# Patient Record
Sex: MALE | Race: WHITE | NOT HISPANIC OR LATINO | Employment: UNEMPLOYED | URBAN - NONMETROPOLITAN AREA
[De-identification: names, ages, dates, MRNs, and addresses within clinical notes are randomized per-mention and may not be internally consistent; named-entity substitution may affect disease eponyms.]

---

## 2017-04-10 ENCOUNTER — HISTORY (OUTPATIENT)
Dept: FAMILY MEDICINE | Facility: OTHER | Age: 27
End: 2017-04-10

## 2017-04-10 ENCOUNTER — OFFICE VISIT - GICH (OUTPATIENT)
Dept: FAMILY MEDICINE | Facility: OTHER | Age: 27
End: 2017-04-10

## 2017-04-10 DIAGNOSIS — J00 ACUTE NASOPHARYNGITIS (COMMON COLD): ICD-10-CM

## 2017-06-09 ENCOUNTER — OFFICE VISIT - GICH (OUTPATIENT)
Dept: FAMILY MEDICINE | Facility: OTHER | Age: 27
End: 2017-06-09

## 2017-06-09 ENCOUNTER — HISTORY (OUTPATIENT)
Dept: FAMILY MEDICINE | Facility: OTHER | Age: 27
End: 2017-06-09

## 2017-06-09 ENCOUNTER — HOSPITAL ENCOUNTER (OUTPATIENT)
Dept: RADIOLOGY | Facility: OTHER | Age: 27
End: 2017-06-09
Attending: FAMILY MEDICINE

## 2017-06-09 DIAGNOSIS — L29.9 PRURITUS: ICD-10-CM

## 2017-06-09 DIAGNOSIS — R05.9 COUGH: ICD-10-CM

## 2017-06-10 ENCOUNTER — HISTORY (OUTPATIENT)
Dept: EMERGENCY MEDICINE | Facility: OTHER | Age: 27
End: 2017-06-10

## 2017-08-23 ENCOUNTER — HISTORY (OUTPATIENT)
Dept: FAMILY MEDICINE | Facility: OTHER | Age: 27
End: 2017-08-23

## 2017-08-23 ENCOUNTER — OFFICE VISIT - GICH (OUTPATIENT)
Dept: FAMILY MEDICINE | Facility: OTHER | Age: 27
End: 2017-08-23

## 2017-08-23 DIAGNOSIS — R21 RASH AND OTHER NONSPECIFIC SKIN ERUPTION: ICD-10-CM

## 2017-08-23 DIAGNOSIS — G47.00 INSOMNIA: ICD-10-CM

## 2017-08-23 DIAGNOSIS — Z72.0 TOBACCO USE: ICD-10-CM

## 2017-08-23 DIAGNOSIS — Z91.89 OTHER SPECIFIED PERSONAL RISK FACTORS, NOT ELSEWHERE CLASSIFIED: ICD-10-CM

## 2017-08-23 ASSESSMENT — ANXIETY QUESTIONNAIRES
3. WORRYING TOO MUCH ABOUT DIFFERENT THINGS: NOT AT ALL
GAD7 TOTAL SCORE: 4
1. FEELING NERVOUS, ANXIOUS, OR ON EDGE: SEVERAL DAYS
2. NOT BEING ABLE TO STOP OR CONTROL WORRYING: NOT AT ALL
7. FEELING AFRAID AS IF SOMETHING AWFUL MIGHT HAPPEN: NOT AT ALL
6. BECOMING EASILY ANNOYED OR IRRITABLE: SEVERAL DAYS
4. TROUBLE RELAXING: SEVERAL DAYS
5. BEING SO RESTLESS THAT IT IS HARD TO SIT STILL: SEVERAL DAYS

## 2017-08-23 ASSESSMENT — PATIENT HEALTH QUESTIONNAIRE - PHQ9: SUM OF ALL RESPONSES TO PHQ QUESTIONS 1-9: 7

## 2017-10-02 ENCOUNTER — COMMUNICATION - GICH (OUTPATIENT)
Dept: FAMILY MEDICINE | Facility: OTHER | Age: 27
End: 2017-10-02

## 2017-10-02 DIAGNOSIS — Z72.0 TOBACCO USE: ICD-10-CM

## 2017-10-02 ASSESSMENT — PATIENT HEALTH QUESTIONNAIRE - PHQ9: SUM OF ALL RESPONSES TO PHQ QUESTIONS 1-9: 14

## 2017-11-03 ENCOUNTER — COMMUNICATION - GICH (OUTPATIENT)
Dept: FAMILY MEDICINE | Facility: OTHER | Age: 27
End: 2017-11-03

## 2017-11-07 ENCOUNTER — OFFICE VISIT - GICH (OUTPATIENT)
Dept: FAMILY MEDICINE | Facility: OTHER | Age: 27
End: 2017-11-07

## 2017-11-07 DIAGNOSIS — Z72.0 TOBACCO USE: ICD-10-CM

## 2017-11-07 DIAGNOSIS — F41.8 OTHER SPECIFIED ANXIETY DISORDERS: ICD-10-CM

## 2017-11-07 DIAGNOSIS — H61.92: ICD-10-CM

## 2017-11-07 DIAGNOSIS — Z23 ENCOUNTER FOR IMMUNIZATION: ICD-10-CM

## 2017-11-07 DIAGNOSIS — J30.2 OTHER SEASONAL ALLERGIC RHINITIS: ICD-10-CM

## 2017-11-07 ASSESSMENT — PATIENT HEALTH QUESTIONNAIRE - PHQ9: SUM OF ALL RESPONSES TO PHQ QUESTIONS 1-9: 8

## 2017-11-29 ENCOUNTER — AMBULATORY - GICH (OUTPATIENT)
Dept: SCHEDULING | Facility: OTHER | Age: 27
End: 2017-11-29

## 2017-12-27 ENCOUNTER — HISTORY (OUTPATIENT)
Dept: EMERGENCY MEDICINE | Facility: OTHER | Age: 27
End: 2017-12-27

## 2017-12-28 NOTE — TELEPHONE ENCOUNTER
Patient Information     Patient Name MRN Sex Demarco Nascimento [id number] Male 1990      Telephone Encounter by James Rich at 10/2/2017  2:15 PM     Author:  James Rich Service:  (none) Author Type:  (none)     Filed:  10/2/2017  2:16 PM Encounter Date:  10/2/2017 Status:  Signed     :  James Rich            After birth date was verified, spoke with Demarco and let him know the below information from Rafa Chamberlain MD. Transferred patient to appointment line to schedule follow up depression appointment with Rafa Chamberlain MD. No further questions or concerns.    James Rich ....................  10/2/2017   2:16 PM

## 2017-12-28 NOTE — TELEPHONE ENCOUNTER
Patient Information     Patient Name MRN Sex Demarco Nascimento [id number] Male 1990      Telephone Encounter by James Rich at 10/2/2017 10:36 AM     Author:  James Rich Service:  (none) Author Type:  (none)     Filed:  10/2/2017 10:37 AM Encounter Date:  10/2/2017 Status:  Signed     :  James Rich            Left message to call back  ...................James Rich LPN....10/2/2017 10:37 AM

## 2017-12-28 NOTE — TELEPHONE ENCOUNTER
Patient Information     Patient Name MRN Sex Demarco Nascimento [id number] Male 1990      Telephone Encounter by Katie Echols at 11/3/2017  1:15 PM     Author:  Katie Echols Service:  (none) Author Type:  (none)     Filed:  11/3/2017  1:18 PM Encounter Date:  11/3/2017 Status:  Signed     :  Katie Echols            Patient states he had to cancel his appointment for today (last minute.) He is now scheduled for next Tuesday. He is wondering if Dr Chamberlain knows who could sew up the hole in his ear from piercing. States he had asked about this before but would like to get this set up so he can go back in the . He is OK with talking about it when he comes in next week.

## 2017-12-28 NOTE — TELEPHONE ENCOUNTER
Patient Information     Patient Name MRN Sex Demarco Nascimento [id number] Male 1990      Telephone Encounter by Rafa Chamberlain MD at 11/3/2017  2:57 PM     Author:  Rafa Chamberlain MD Service:  (none) Author Type:  Physician     Filed:  11/3/2017  3:00 PM Encounter Date:  11/3/2017 Status:  Signed     :  Rafa Chamberlain MD (Physician)            I left a message to see if our general surgeons do this. I think most ENT or dermatologists could do this also but it would require travel.  It would likely be cosmetic through and he would need to be able to pay out of pocket.

## 2017-12-28 NOTE — TELEPHONE ENCOUNTER
Patient Information     Patient Name MRN Sex Demarco Nascimento [id number] Male 1990      Telephone Encounter by Rafa Chamberlain MD at 10/2/2017  1:38 PM     Author:  Rafa Chamberlain MD Service:  (none) Author Type:  Physician     Filed:  10/2/2017  1:38 PM Encounter Date:  10/2/2017 Status:  Signed     :  Rafa Chamberlain MD (Physician)            Please have patient schedule a follow up with me in the next month.

## 2017-12-28 NOTE — TELEPHONE ENCOUNTER
Patient Information     Patient Name MRN Sex Demarco Nascimento [id number] Male 1990      Telephone Encounter by James Rich at 10/2/2017  1:56 PM     Author:  James Rich Service:  (none) Author Type:  (none)     Filed:  10/2/2017  1:57 PM Encounter Date:  10/2/2017 Status:  Signed     :  James Rich            Left message to call back  ...................James Rich LPN....10/2/2017 1:56 PM

## 2017-12-28 NOTE — TELEPHONE ENCOUNTER
Patient Information     Patient Name MRN Sex Demarco Nascimento [id number] Male 1990      Telephone Encounter by Katie Echols at 2017  1:40 PM     Author:  Katie Echols Service:  (none) Author Type:  (none)     Filed:  2017  1:41 PM Encounter Date:  11/3/2017 Status:  Signed     :  Katie Echols            Patient notified of Dr Chamberlain's response.

## 2017-12-28 NOTE — TELEPHONE ENCOUNTER
Patient Information     Patient Name MRN Sex Demarco Nascimento [id number] Male 1990      Telephone Encounter by Rafa Chamberlain MD at 2017  1:23 PM     Author:  Rafa Chamberlain MD Service:  (none) Author Type:  Physician     Filed:  2017  1:23 PM Encounter Date:  11/3/2017 Status:  Signed     :  Rafa Chamberlain MD (Physician)            Would need to be ENT or dermatology.

## 2017-12-28 NOTE — TELEPHONE ENCOUNTER
Patient Information     Patient Name MRN Sex Demarco Nascimento [id number] Male 1990      Telephone Encounter by Rox Kearns at 11/3/2017 11:40 AM     Author:  Rox Kearns Service:  (none) Author Type:  (none)     Filed:  11/3/2017 11:41 AM Encounter Date:  11/3/2017 Status:  Signed     :  Rox Kearns            JTC-pt has questions on getting pierced ears sewn up

## 2017-12-28 NOTE — PROGRESS NOTES
Patient Information     Patient Name MRN Sex Demarco Nascimento [id number] Male 1990      Progress Notes by Rafa Chamberlain MD at 2017  1:00 PM     Author:  Rafa Chamberlain MD Service:  (none) Author Type:  Physician     Filed:  2017  7:45 AM Encounter Date:  2017 Status:  Signed     :  Rafa Chamberlain MD (Physician)            SUBJECTIVE:  Demarco Epps is a 27 y.o. Male.  He comes in today to follow-up on several issues. He reports that the rash seemed to resolve with the prednisone burst. He has not had any recurrence in this issue.    He tells me that he has had some worsening issues with mood and motivation. This has been present for the past couple months. Reports lack of motivation. At times overeats. Sleep is better if he uses Unisom. He has variable sleep hygiene. Does not snore. Feels that sleep efficiency is normal.    He denies any ruminating thinking. He has not had any problems recently with panic attacks. He does not feel that he is having symptoms concerning for PTSD.    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 He does continue to smoke and would like to quit that for the overall health improvement but also notably for his cardiovascular endurance and health.    PHQ Depression Screening 2017   Date of PHQ exam (doc flow) 2017   1. Lack of interest/pleasure 0 - Not at all 1 - Several days   2. Feeling down/depressed 0 - Not at all 1 - Several days   PHQ-2 TOTAL SCORE 0 2   3. Trouble sleeping - 1 - Several days   4. Decreased energy - 1 - Several days   5. Appetite change - 0 - Not at all   6. Feelings of failure - 2 - More than half the days   7. Trouble concentrating - 0 - Not at all   8. Activity level - 1 - Several days   9. Hurting yourself - 0 - Not at all   PHQ-9 TOTAL SCORE - 7   PHQ-9 Severity Level - mild   Functional Impairment - somewhat difficult   Some recent data might be hidden        RICK-7 ANXIETY SCREENING 8/23/2017   RICK date (doc flow) 8/23/2017   Nervous, anxious 1   Cannot stop worrying 0   Worry about different things 0   Cannot relax 1   Feeling restless 1   Easily annoyed/irritated 1   Afraid of awful event 0   Score 4   Severity none   Some recent data might be hidden          Social History        Substance Use Topics          Smoking status:   Current Every Day Smoker      Years:  10.00      Types:  Cigars      Start date:  11/1/2015      Smokeless tobacco:   Current User      Types:  Chew       Comment: vape       Alcohol use   7.2 oz/week     12 Standard drinks or equivalent per week         I have personally reviewed and updated above noted social, family and/or past medical history.    A comprehensive review of systems was negative except for items noted in HPI/Subjective.      OBJECTIVE:  /80  Pulse 68  Wt 88 kg (194 lb)  BMI 29.4 kg/m2  EXAM:  General Appearance: Pleasant, alert, appropriate appearance for age. No acute distress  Chest/Respiratory Exam: Normal chest wall and respirations. Clear to auscultation.  Cardiovascular Exam: Regular rate and rhythm. S1, S2, no murmur, click, gallop, or rubs.    ASSESSMENT/Plan :          Demarco was seen today for depression.    Diagnoses and all orders for this visit:    Tobacco abuse  patient would like to start Wellbutrin to help him quit smoking. Discussed him tobacco cessation strategies. Quit plan was developed. She will follow-up with me in one month if he is not able to completely quit smoking.  -     buPROPion (WELLBUTRIN XL) 150 mg Extended-Release tablet; Take 1 tablet by mouth every morning.    Lack of motivation   patient reports that he is not depressed but does have symptoms concerning for dysthymia. I think the Wellbutrin that we are using for tobacco cessation may benefit his motivation but also stressed importance of her vascular health, releases neurotransmitters that can significantly improve mood and will  help increased testosterone which also may boost energy levels. Stressed importance of choosing healthy foods. Explained him which types of foods tend to provide higher nutritional value.    Rash   this has resolved    Insomnia, unspecified type   Discussed good sleep hygiene with patient, this includes using bed only for sleep or sex, no alcohol or caffiene for 5 hours prior to bedtime, no daytime naps and consistent sleep/wake times.  Also stressed importance of regular exercise.  Patient voiced good understanding.          There are no Patient Instructions on file for this visit.    Rafa Chamberlain MD    This document was created using computer generated templates and voice activated software.

## 2017-12-28 NOTE — PROGRESS NOTES
Patient Information     Patient Name MRN Sex     Demarco Epps [id number] Male 1990      Progress Notes by Rafa Chamberlain MD at 2017  1:30 PM     Author:  Rafa Chamberlain MD Service:  (none) Author Type:  Physician     Filed:  2017  8:41 AM Encounter Date:  2017 Status:  Signed     :  Rafa Chamberlain MD (Physician)            Nursing Notes:   Katie Echols  2017  1:52 PM  Signed  Patient comes in to the clinic today to follow up on his depression. He would also like a referral to have his ear piercing sewed up because of  guidelines.      SUBJECTIVE:  Demarco Epps is a 27 y.o. Male. He comes in today to follow-up on depression. He has been taking the Wellbutrin as prescribed. He did have a little bit of stomachache when he first started and then decided to stop the medication for 3 days. He reports when he stopped the medication he felt quite a bit worse so he did restart. He has noticed he was getting up better in the morning. Feels more energetic. His low mood is improved. He reports sleeping is still relatively poor but it is functional. He is utilizing Unisom which is helpful. He reports less ruminating thinking and anxiety. Overall just feels that he is doing better.      PHQ Depression Screening 10/2/2017 2017   Date of PHQ exam (doc flow) 10/2/2017 2017   1. Lack of interest/pleasure 2 - More than half the days 1 - Several days   2. Feeling down/depressed 2 - More than half the days 1 - Several days   PHQ-2 TOTAL SCORE 4 2   3. Trouble sleeping 2 - More than half the days 1 - Several days   4. Decreased energy 2 - More than half the days 1 - Several days   5. Appetite change 0 - Not at all 0 - Not at all   6. Feelings of failure 2 - More than half the days 2 - More than half the days   7. Trouble concentrating 2 - More than half the days 0 - Not at all   8. Activity level 2 - More than half the days 2 - More than half the days   9.  Hurting yourself 0 - Not at all 0 - Not at all   PHQ-9 TOTAL SCORE 14 8   PHQ-9 Severity Level moderate mild   Functional Impairment somewhat difficult -   Some recent data might be hidden       he continues to have concerns about his left earlobe and would like to have surgery to repair.    Social History        Substance Use Topics          Smoking status:   Current Every Day Smoker      Years:  10.00      Types:  Cigars      Start date:  11/1/2015      Smokeless tobacco:   Current User      Types:  Chew       Comment: vape       Alcohol use   7.2 oz/week     12 Standard drinks or equivalent per week         I have personally reviewed and updated above noted social, family and/or past medical history.    A comprehensive review of systems was negative except for items noted in HPI/Subjective.      OBJECTIVE:  /80  Pulse 64  Wt 88 kg (194 lb)  BMI 29.4 kg/m2  EXAM:  General Appearance: Pleasant, alert, appropriate appearance for age. No acute distress  Ear Exam: Normal TM's bilaterally. Normal auditory canals and external ears. Non-tender. Left earlobe with large healed hole from gaged earring  Nose Exam: Pale and boggy turbinates.  Clear drainage noted  OroPharynx Exam: Mild posterior inflammation  Neck Exam: Supple, no masses or nodes.  Thyroid Exam: No nodules or enlargement.  Chest/Respiratory Exam: Normal chest wall and respirations. Clear to auscultation.  Cardiovascular Exam: Regular rate and rhythm. S1, S2, no murmur, click, gallop, or rubs.    ASSESSMENT/Plan :        Results for orders placed or performed during the hospital encounter of 06/10/17      Comprehensive Metabolic Panel      Result  Value Ref Range    SODIUM 142 133 - 143 mmol/L    POTASSIUM 3.7 3.5 - 5.1 mmol/L    CHLORIDE 102 98 - 107 mmol/L    CO2,TOTAL 26 21 - 31 mmol/L    ANION GAP 14 5 - 18                    GLUCOSE 91 70 - 105 mg/dL    CALCIUM 8.9 8.6 - 10.3 mg/dL    BUN 18 7 - 25 mg/dL    CREATININE 1.11 0.70 - 1.30 mg/dL     BUN/CREAT RATIO           16                    GFR if African American >60 >60 ml/min/1.73m2    GFR if not African American >60 >60 ml/min/1.73m2    ALBUMIN 4.3 3.5 - 5.7 g/dL    PROTEIN,TOTAL 7.0 6.4 - 8.9 g/dL    GLOBULIN                  2.7 2.0 - 3.7 g/dL    A/G RATIO 1.6 1.0 - 2.0                    BILIRUBIN,TOTAL 0.3 0.3 - 1.0 mg/dL    ALK PHOSPHATASE 39 34 - 104 IU/L    ALT (SGPT) 28 7 - 52 IU/L    AST (SGOT) 28 13 - 39 IU/L   C-Reactive Protein      Result  Value Ref Range    C-REACTIVE PROTEIN <1.000 <1.000 mg/dL   CBC WITH AUTO DIFFERENTIAL      Result  Value Ref Range    WHITE BLOOD COUNT         7.9 4.5 - 11.0 thou/cu mm    RED BLOOD COUNT           4.40 4.30 - 5.90 mil/cu mm    HEMOGLOBIN                13.6 13.5 - 17.5 g/dL    HEMATOCRIT                40.4 37.0 - 53.0 %    MCV                       92 80 - 100 fL    MCH                       30.9 26.0 - 34.0 pg    MCHC                      33.7 32.0 - 36.0 g/dL    RDW                       13.2 11.5 - 15.5 %    PLATELET COUNT            196 140 - 440 thou/cu mm    MPV                       9.8 6.5 - 11.0 fL    NEUTROPHILS               75.3 (H) 42.0 - 72.0 %    LYMPHOCYTES               16.8 (L) 20.0 - 44.0 %    MONOCYTES                 6.1 <12.0 %    EOSINOPHILS               1.4 <8.0 %    BASOPHILS                 0.3 <3.0 %    ABSOLUTE NEUTROPHILS      6.0 1.7 - 7.0 thou/cu mm    ABSOLUTE LYMPHOCYTES      1.3 0.9 - 2.9 thou/cu mm    ABSOLUTE MONOCYTES        0.5 <0.9 thou/cu mm    ABSOLUTE EOSINOPHILS      0.1 <0.5 thou/cu mm    ABSOLUTE BASOPHILS        0.0 <0.3 thou/cu mm   TROPONIN I      Result  Value Ref Range    TROPONIN I <0.030 <0.034 ng/mL       Demarco was seen today for follow up and derm problem.    Diagnoses and all orders for this visit:    Disorder of ear lobe, left  I did discuss with our surgery team and they did not feel they would be able to do this repair. Will refer to dermatology. I explained to patient that this is a  cosmetic procedure and he may want to check with insurance coverage or [facility] prior to proceeding.  -     AMB CONSULT TO DERMATOLOGY; Future    Anxiety and depression   symptoms significantly improved. He does not feel however that symptoms have had complete resolution. Will increase Wellbutrin to 450 mg.  He does not report any past or current concerns with alcohol abuse but I would suggest that he discontinue altogether while we are treating his mood disorder. I explained that the Wellbutrin can cause increased effects of alcohol.    Tobacco abuse  patient has not yet formally quit smoking. Strongly suggest complete cessation.  -     buPROPion 450 mg Tb24; Take 450 mg by mouth every morning.    Acute seasonal allergic rhinitis, unspecified trigger  we will trial Flonase.  -     fluticasone (50 mcg per actuation) nasal solution (FLONASE); Inhale 1 Spray into both nostrils 2 times daily.    Need for prophylactic vaccination and inoculation against influenza  -     FLU VACCINE => 3 YRS PF QUADRIVALENT IIV4 IM  -     [place] ADMIN VACC INITIAL    Need for prophylactic vaccination with combined diphtheria-tetanus-pertussis (DTaP) vaccine  -     TDAP VACCINE IM  -     [place] ADMIN EA ADDL VACC          There are no Patient Instructions on file for this visit.    Rafa Chamberlain MD    This document was created using computer generated templates and voice activated software.

## 2017-12-28 NOTE — PROGRESS NOTES
"Patient Information     Patient Name MRN Sex Demarco Montez [id number] Male 1990      Progress Notes by Rafa Chamberlain MD at 2017 12:15 PM     Author:  Rafa Chamberlain MD Service:  (none) Author Type:  Physician     Filed:  6/15/2017  5:58 AM Encounter Date:  2017 Status:  Signed     :  Rafa Chamberlain MD (Physician)            SUBJECTIVE:  Demarco Epps is a 27 y.o. Male.  He comes in today for evaluation of 2 issues. He reports she has had migratory skin itching over the past month. He has not noticed any rash. Reports she will just have intense itching over his leg and then over several hours to a day will resolve and days later will appear to different area of his body. He feels that itching actually makes it better. He is not aware of any bug bites or exposure to plants or animals that would cause irritation. He has not changed his soap, laundry detergent, shampoo etc. No new clothes. Again has not noticed a rash. Currently not having symptoms.    Next issue is cough. Has been present for 2 months. The cough is typically nonproductive but occasionally will have some clear sputum. He reports occasional be associated with hoarse voice. Has not had any problems with swallowing. Denies any acid reflux. Denies any fevers or chills.  Patient does report that he \"vapors\" frequently. Has been doing this now for over 6 months. Not really sure if the cough seems to be associated with this use.      OBJECTIVE:  /82  Pulse 76  Temp 98.1  F (36.7  C)  Wt 93 kg (205 lb)  SpO2 98%  BMI 31.17 kg/m2  EXAM:  General Appearance: Pleasant, alert, appropriate appearance for age. No acute distress  Ear Exam: TMs normal.  No erythema  Nose Exam: Pale and boggy turbinates.  No drainage noted  OroPharynx Exam: Mild posterior inflammation  Neck Exam: Supple, no masses or nodes.  Chest/Respiratory Exam: Normal chest wall and respirations. Clear to auscultation.  Cardiovascular " Exam: Regular rate and rhythm. S1, S2, no murmur, click, gallop, or rubs.  Skin: Currently no rash identified      No results found for this or any previous visit.    ASSESSMENT/Plan :      Demarco was seen today for rash and cough.    Diagnoses and all orders for this visit:    Itching  -     predniSONE (DELTASONE) 20 mg tablet; Take 40mg with breakfast and 20mg with lunch for 5d.  -     triamcinolone 0.1% TOPICAL (KENALOG) 0.1 % lotion; Apply  topically to affected area(s) 2 times daily.    Subacute cough  -     XR CHEST 2 VIEWS PA AND LATERAL; Future    Patient was a difficult historian. I discussed with him that his symptoms seem to be migratory and mild in nature. His exam was reassuring. Patient had flat affect but really was looking for additional workup and trial of therapy. I discussed getting a chest x-ray for the cough. I don't think laboratory evaluation at this time would provide much value which he voiced good understanding about. I think ultimately he needs to quit date being as this is very likely to be causing at minimum the cough and could even be causing the itching that he is having some sort of a reaction to one of the several chemicals in the inhalants. I think a burst of prednisone would be reasonable as it may resolve subacute cough if this is due to postinfectious inflammation, mild underlying asthma or allergies. He may also use triamcinolone cream if he does have localized itching. Patient should follow up in clinic in a week or 2 if he is not having improvement of symptoms. He'll come in sooner with any worsening.    There are no Patient Instructions on file for this visit.    Rafa Chamberlain MD    This document was created using computer generated templates and voice activated software.

## 2017-12-28 NOTE — TELEPHONE ENCOUNTER
Patient Information     Patient Name MRN Sex Demarco Nascimento [id number] Male 1990      Telephone Encounter by James Rich at 10/2/2017 10:58 AM     Author:  James Rich Service:  (none) Author Type:  (none)     Filed:  10/2/2017 11:04 AM Encounter Date:  10/2/2017 Status:  Signed     :  James Rich            After birth date was verified, patient stated that he would like to increase his dose of Wellbutrin as he does not feel as though it is currently helping him. Please see new PHQ9 completed on 10/2.     Please advise.    James Rich ....................  10/2/2017   11:03 AM

## 2017-12-30 NOTE — NURSING NOTE
Patient Information     Patient Name MRN Sex Demarco Nascimento [id number] Male 1990      Nursing Note by Katie Echols at 2017  1:00 PM     Author:  Katie Echols Service:  (none) Author Type:  (none)     Filed:  2017  1:32 PM Encounter Date:  2017 Status:  Signed     :  Katie Echols            Patient presents to [facility] clinic. States he is here for a follow up to his previous visit then specifically asks if he could be started on a medication for depression.

## 2017-12-30 NOTE — NURSING NOTE
Patient Information     Patient Name MRN Sex Demarco Nascimento [id number] Male 1990      Nursing Note by Katie Echols at 2017  1:30 PM     Author:  Katie Echols Service:  (none) Author Type:  (none)     Filed:  2017  1:52 PM Encounter Date:  2017 Status:  Signed     :  Katie Echols            Patient comes in to the clinic today to follow up on his depression. He would also like a referral to have his ear piercing sewed up because of  guidelines.

## 2018-01-04 NOTE — PROGRESS NOTES
"Patient Information     Patient Name MRN Sex     Demarco Epps [id number] Male 1990      Progress Notes by Chuyita Jain NP at 4/10/2017  6:15 PM     Author:  Chuyita Jain NP Service:  (none) Author Type:  PHYS- Nurse Practitioner     Filed:  4/10/2017  7:04 PM Encounter Date:  4/10/2017 Status:  Signed     :  Chuyita Jain NP (PHYS- Nurse Practitioner)            Nursing Notes:   Roxane Leon  4/10/2017  6:52 PM  Signed  Patient presents to clinic with sinus pain, sinus pressure, and cough x3 day  Roxane Leon LPN....................  4/10/2017   6:29 PM    SUBJECTIVE:    Demarco Epps is a 26 y.o. male who presents for URI for 3 days    URI    This is a new problem. Episode onset: 3 days. There has been no fever. Associated symptoms include congestion, coughing, headaches, a plugged ear sensation, rhinorrhea, sinus pain, sneezing and a sore throat. Pertinent negatives include no ear pain, swollen glands, vomiting or wheezing. He has tried NSAIDs for the symptoms. The treatment provided mild relief.       No current outpatient prescriptions on file prior to visit.     No current facility-administered medications on file prior to visit.        REVIEW OF SYSTEMS:  Review of Systems   HENT: Positive for congestion, rhinorrhea, sneezing and sore throat. Negative for ear pain.    Respiratory: Positive for cough. Negative for wheezing.    Gastrointestinal: Negative for vomiting.   Neurological: Positive for headaches.       OBJECTIVE:  /80  Pulse 92  Temp 98.2  F (36.8  C) (Tympanic)  Resp 20  Ht 1.727 m (5' 8\")  Wt 90.9 kg (200 lb 6 oz)  BMI 30.47 kg/m2    EXAM:   Physical Exam   Constitutional: He is well-developed, well-nourished, and in no distress.   HENT:   Head: Normocephalic and atraumatic.   Right Ear: Tympanic membrane and ear canal normal.   Left Ear: Tympanic membrane and ear canal normal.   Nose: Rhinorrhea present.   Mouth/Throat: Uvula is midline and " mucous membranes are normal. Posterior oropharyngeal erythema present. No oropharyngeal exudate or posterior oropharyngeal edema.   Pressure in the maxillary sinus area   Eyes: Conjunctivae are normal.   Neck: Neck supple.   Cardiovascular: Normal rate, regular rhythm and normal heart sounds.    Pulmonary/Chest: Effort normal and breath sounds normal. No respiratory distress. He has no wheezes. He has no rales.   Lymphadenopathy:     He has no cervical adenopathy.   Skin: Skin is warm and dry. No rash noted.   Nursing note and vitals reviewed.      ASSESSMENT/PLAN:    ICD-10-CM    1. Acute nasopharyngitis J00         Plan:  Long discussion had about viral illness. He was not having it, stated it is a sinus infection. Explained it is viral first for the next 14 days. OTC discussed with home cares. Discussed that OTC does not take the cold away, only is symptom management. F/u if needed.   Symptoms likely due to virus. No antibiotic is needed at this time. Symptoms typically worse on days 2-5 and then stabilize and you are sick for days 5-12. Days 12-14 there is slow resolution and if there is a cough, studies show it can linger longer, however one is not as ill as in the beginning. If symptoms begin worsening or fail to improve after 14 days, return to clinic for reevaluation. All questions were answered and he is in agreement with plan.       IVONE ARREOLA NP ....................  4/10/2017   7:04 PM

## 2018-01-04 NOTE — NURSING NOTE
Patient Information     Patient Name MRN Sex Demarco Nascimento [id number] Male 1990      Nursing Note by Roxane Leon at 4/10/2017  6:15 PM     Author:  Roxane Leon Service:  (none) Author Type:  (none)     Filed:  4/10/2017  6:52 PM Encounter Date:  4/10/2017 Status:  Signed     :  Roxane Leon            Patient presents to clinic with sinus pain, sinus pressure, and cough x3 day  Roxane Leon LPN....................  4/10/2017   6:29 PM

## 2018-01-04 NOTE — PATIENT INSTRUCTIONS
Patient Information     Patient Name MRN Demarco Altamirano [id number] Male 1990      Patient Instructions by Chuyita Jain NP at 4/10/2017  6:15 PM     Author:  Chuyita Jain NP Service:  (none) Author Type:  PHYS- Nurse Practitioner     Filed:  4/10/2017  6:52 PM Encounter Date:  4/10/2017 Status:  Signed     :  Chuyita Jain NP (PHYS- Nurse Practitioner)            Cold Medicines   What are cold medicines?  Symptoms of the common cold start gradually over several days and usually last about two weeks. Symptoms may include sneezing, a stuffy or runny nose, sore throat, cough, watery eyes, mild headache, or body aches. A cold will go away on its own without treatment. However, there are many nonprescription products that may help relieve some of the symptoms of a cold. Cold medicines often contain more than one ingredient and are used to treat more than one symptom. Read the labels and buy products that have only the ingredients that you need. If you are not sure which medicine is best, ask your pharmacist.  How do they work?  Decongestants reduce swelling in your nose and sinuses. They may also lessen the amount of mucus made by your nose. If you use decongestants more often than directed, your stuffy nose may get worse.   Antihistamines block the effect of histamine. Histamine is a chemical your body makes when you have an allergic reaction. Antihistamines are most often used to treat itchy or watery eyes or a stuffy or runny nose caused by an allergy. Antihistamines may not help a stuffy or runny nose caused by a cold because they can make mucus thick and dry.  Mucolytics are medicines that make mucus thinner so that it is easier to cough up out of your throat and lungs.  Expectorants are cough medicines that may help to keep the mucus thin and bring up mucus from the lungs when you cough. This may relieve chest congestion and make it easier to breathe.   Cough suppressants  (antitussives) are medicines that lessen the urge to cough. They may give relief from a dry, hacking cough. If you have a cough that is wet sounding and produces mucus, it is important for you to cough the mucus up out of your lungs. For this reason, cough suppressants are not recommended for a wet sounding cough.  Fever and pain relievers, such as acetaminophen, aspirin, or other nonsteroidal anti-inflammatory drugs (NSAIDs), are often included in cold medicine. Read labels carefully to avoid taking more medicine than you need.  What else do I need to know about this medicine?    Talk to your healthcare provider if your symptoms start suddenly or you have severe symptoms. This may mean you have something more serious than a cold.    Follow the directions that come with your medicine, including information about food or alcohol. Make sure you know how and when to take your medicine. Do not take more or less than you are supposed to take.    Try to get all of your medicine at the same place. Your pharmacist can help make sure that all of your medicines are safe to take together.    Keep a list of your medicines with you. List all of the prescription medicines, nonprescription medicines, supplements, natural remedies, and vitamins that you take. Tell all healthcare providers who treat you about all of the products you are taking.    Many medicines have side effects. A side effect is a symptom or problem that is caused by the medicine. Ask your healthcare provider or pharmacist what side effects the medicine may cause and what you should do if you have side effects.    Nonsteroidal anti-inflammatory medicines (NSAIDs), such as ibuprofen, naproxen, and aspirin, may cause stomach bleeding and other problems. These risks increase with age. Read the label and take as directed. Unless recommended by your healthcare provider, do not take for more than 10 days for any reason.    Acetaminophen may cause liver damage or other  problems. Unless recommended by your provider, don't take more than 3000 milligrams (mg) in 24 hours. To make sure you don t take too much, check other medicines you take to see if they also contain acetaminophen. Ask your provider if you need to avoid drinking alcohol while taking this medicine.  If you have any questions, ask your healthcare provider or pharmacist for more information. Be sure to keep all appointments for provider visits or tests.      Symptoms likely due to virus. No antibiotic is needed at this time. Symptoms typically worse on days 2-5 and then stabilize and you are sick for days 5-12. Days 12-14 there is slow resolution and if there is a cough, studies show it can linger longer, however one is not as ill as in the beginning. If symptoms begin worsening or fail to improve after 14 days, return to clinic for reevaluation.

## 2018-01-23 ENCOUNTER — COMMUNICATION - GICH (OUTPATIENT)
Dept: FAMILY MEDICINE | Facility: OTHER | Age: 28
End: 2018-01-23

## 2018-01-23 DIAGNOSIS — Z72.0 TOBACCO USE: ICD-10-CM

## 2018-01-27 VITALS
OXYGEN SATURATION: 98 % | SYSTOLIC BLOOD PRESSURE: 124 MMHG | HEART RATE: 76 BPM | DIASTOLIC BLOOD PRESSURE: 82 MMHG | TEMPERATURE: 98.1 F | WEIGHT: 205 LBS

## 2018-01-27 VITALS
TEMPERATURE: 98.2 F | WEIGHT: 200.38 LBS | HEIGHT: 68 IN | RESPIRATION RATE: 20 BRPM | SYSTOLIC BLOOD PRESSURE: 120 MMHG | HEART RATE: 92 BPM | DIASTOLIC BLOOD PRESSURE: 80 MMHG | BODY MASS INDEX: 30.37 KG/M2

## 2018-01-27 VITALS — DIASTOLIC BLOOD PRESSURE: 80 MMHG | HEART RATE: 68 BPM | WEIGHT: 194 LBS | SYSTOLIC BLOOD PRESSURE: 122 MMHG

## 2018-01-27 VITALS
BODY MASS INDEX: 29.5 KG/M2 | WEIGHT: 194 LBS | DIASTOLIC BLOOD PRESSURE: 80 MMHG | HEART RATE: 64 BPM | SYSTOLIC BLOOD PRESSURE: 122 MMHG

## 2018-01-30 ASSESSMENT — ANXIETY QUESTIONNAIRES: GAD7 TOTAL SCORE: 4

## 2018-01-30 ASSESSMENT — PATIENT HEALTH QUESTIONNAIRE - PHQ9
SUM OF ALL RESPONSES TO PHQ QUESTIONS 1-9: 14
SUM OF ALL RESPONSES TO PHQ QUESTIONS 1-9: 7
SUM OF ALL RESPONSES TO PHQ QUESTIONS 1-9: 8

## 2018-02-01 ENCOUNTER — DOCUMENTATION ONLY (OUTPATIENT)
Dept: FAMILY MEDICINE | Facility: OTHER | Age: 28
End: 2018-02-01

## 2018-02-01 RX ORDER — BUPROPION HYDROCHLORIDE 450 MG/1
450 TABLET, FILM COATED, EXTENDED RELEASE ORAL EVERY MORNING
COMMUNITY
Start: 2017-11-07 | End: 2018-07-26

## 2018-02-01 RX ORDER — FLUTICASONE PROPIONATE 50 MCG
1 SPRAY, SUSPENSION (ML) NASAL 2 TIMES DAILY
COMMUNITY
Start: 2017-11-07 | End: 2019-02-28

## 2018-02-13 NOTE — TELEPHONE ENCOUNTER
Patient Information     Patient Name MRN Sex Demarco Nascimento [id number] Male 1990      Telephone Encounter by Marleny Brannon RN at 2018  2:23 PM     Author:  Marleny Brannon RN Service:  (none) Author Type:  NURS- Registered Nurse     Filed:  2018  2:28 PM Encounter Date:  2018 Status:  Signed     :  Marleny Brannon RN (NURS- Registered Nurse)            Called and spoke to Patient after verifying last name and date of birth. Patient states he is taking the 450 mg tablets and the pharmacy had accidentally sent over this request and then gave him an emergency prescription for 1 month. Patient seen on 2017 by PCP for depression and no follow-up requirements were noted.    Emergency refill should get Patient through until end of February. Refill authorized today for 30 days and 2 refills.     Depression-in adults 18 and over    Office visit in the past 12 months or as indicated in chart.  Should have clinic visit 1-2 months after initial prescription.    Last visit with RONALDO DOS SANTOS was on: 2017 in [facility]  Next visit with RONALDO DOS SANTOS is on: No future appointment listed with this provider  Next visit with Family Practice is on: No future appointment listed in this department    Max refills 12 months from last office visit or per providers notes.    Prescription refilled per RN Medication Refill Policy.................... Marleny Brannon RN ....................  2018   2:26 PM

## 2018-02-13 NOTE — TELEPHONE ENCOUNTER
Patient Information     Patient Name MRN Sex Demarco Nascimento [id number] Male 1990      Telephone Encounter by Marleny Brannon RN at 2018  1:37 PM     Author:  Marleny Brannon RN  Service:  (none) Author Type:  NURS- Registered Nurse     Filed:  2018  2:23 PM  Encounter Date:  2018 Status:  Addendum     :  Marleny Brannon RN (NURS- Registered Nurse)        Related Notes: Original Note by Marleny Brannon RN (NURS- Registered Nurse) filed at 2018  1:47 PM                Wellbutrin increased to 450 mg daily on 17. Pharmacy requesting 300 mg tablets.450 mg tablets last filled on  for #30, R-2 and Patient should run out on 2018.    Left message to call back  ....................Marleny Brannon RN  2018   1:47 PM      Depression-in adults 18 and over    Office visit in the past 12 months or as indicated in chart.  Should have clinic visit 1-2 months after initial prescription.    Last visit with RONALDO DOS SANTOS was on: 2017 in [facility]  Next visit with RONALDO DOS SANTOS is on: No future appointment listed with this provider  Next visit with Family Practice is on: No future appointment listed in this department    Max refills 12 months from last office visit or per providers notes.    PHQ Depression Screening 10/2/2017 2017   Date of PHQ exam (doc flow) 10/2/2017 2017   1. Lack of interest/pleasure 2 - More than half the days 1 - Several days   2. Feeling down/depressed 2 - More than half the days 1 - Several days   PHQ-2 TOTAL SCORE 4 2   3. Trouble sleeping 2 - More than half the days 1 - Several days   4. Decreased energy 2 - More than half the days 1 - Several days   5. Appetite change 0 - Not at all 0 - Not at all   6. Feelings of failure 2 - More than half the days 2 - More than half the days   7. Trouble concentrating 2 - More than half the days 0 - Not at all   8. Activity level 2 - More than half the days 2 -  More than half the days   9. Hurting yourself 0 - Not at all 0 - Not at all   PHQ-9 TOTAL SCORE 14 8   PHQ-9 Severity Level moderate mild   Functional Impairment somewhat difficult -   Some recent data might be hidden       Marleny Brannon RN .............. 1/25/2018  1:47 PM

## 2018-04-25 DIAGNOSIS — F32.A DEPRESSION: Primary | ICD-10-CM

## 2018-04-27 RX ORDER — BUPROPION HYDROCHLORIDE 300 MG/1
TABLET ORAL
Qty: 90 TABLET | Refills: 1 | Status: SHIPPED | OUTPATIENT
Start: 2018-04-27 | End: 2019-02-28

## 2018-04-27 NOTE — TELEPHONE ENCOUNTER
Prescription approved per [facility] Refill Protocol.  Noreen Chamberlain RN.............................4/27/2018 4:56 PM

## 2018-07-23 NOTE — PROGRESS NOTES
"Patient Information     Patient Name  Demarco Epps MRN  [id number] Sex  Male   1990      Letter by Rafa Chamberlain MD at      Author:  Rafa Chamberlain MD Service:  (none) Author Type:  (none)    Filed:   Encounter Date:  2017 Status:  (Other)           Demarco Epps  [address]  [address]  [facility] [address]          2017    Dear Mr. Epps:    Welcome to [employer]!   Remember to activate your account quickly -  Your activation code expires in 45 days!    With [employer], you can view your health information, email your provider, schedule clinic appointments, get after visit instructions and more - online, anytime.     To activate your [employer] account, you will need:     to visit [email or web address]    your [employer] activation code: [email or web address]    Expires: 2017 12:30 PM     the last four digits of your Social Security number (SSN)     your date of birth.     Step-by-step instructions on how to set up your [employer] account are shown on the following page. If you requested access to your child/dependent s [employer] account or you have been granted access to another adult s [employer] account, instructions for viewing their information are also on the following page.     For questions or technical assistance, call [phone].    Thank you for choosing [facility] activation instructions     Activation code: [email or web address]  Expires: 2017 12:30 PM     Step 1: Go to [email or web address]     Step 2: Click on Enter your activation code.     Step 3: Type in your activation code (provided above), the last four digits of your Social Security number (SSN) and date of birth. This information is only required once. The next time you sign in to your account you will only need your username and password. If you receive an error that states \"Invalid Social Security number  or  Invalid date of birth\" that means the information we have on file does not " match the information entered. Please call [phone] for assistance.     Step 4: Choose a username that is easy for you to remember, but hard for others to guess. Your username must:     be between five and 24 characters     contain only letters and numbers (no symbols)   Once selected, your username cannot be changed.     Step 5: Choose a password. Your password must:     be at least eight characters     contain at least one uppercase letter     contain one lowercase letter     contain one number or symbol     be different than your username.     Step 6: Choose a security question. This will allow you to reset your password.     Step 7: Enter the answer to your security question. The answer cannot be the same as your password.     Step 8: Enter your email address. You will receive email notifications when new information is available in [email or web address]. You are now ready to start using [email or web address]!     If you requested proxy access for a child s or another adult s [email or web address] account, you will be able to access their health record by clicking on their name    Instructions for Child/Dependent Access  To view your child s record, click on your child's name under your name on the right hand side of the screen.   Instructions for Adult Proxy Access  To view your adult proxy record, click on the adult's name under your name on the right hand side of the screen.    In the event you have technical difficulties, please call   [email or web address] Services at [phone].

## 2018-07-26 DIAGNOSIS — F32.A ANXIETY AND DEPRESSION: Primary | ICD-10-CM

## 2018-07-26 DIAGNOSIS — F41.9 ANXIETY AND DEPRESSION: Primary | ICD-10-CM

## 2018-07-30 RX ORDER — BUPROPION HYDROCHLORIDE 150 MG/1
TABLET ORAL
Qty: 30 TABLET | Refills: 2 | Status: SHIPPED | OUTPATIENT
Start: 2018-07-30 | End: 2018-11-01

## 2018-07-30 NOTE — TELEPHONE ENCOUNTER
"Refill request from [facility] for:  buPROPion (WELLBUTRIN XL) 150 MG 24 hr tablet    LOV 11/7/2017 with Rafa Chamberlain MD    Medication refilled for 90 days      Requested Prescriptions   Pending Prescriptions Disp Refills     buPROPion (WELLBUTRIN XL) 150 MG 24 hr tablet [Pharmacy Med Name: BUPROPION XL 150MG TABLETS (24 H)] 30 tablet 0     Sig: TAKE ONE TABLET BY MOUTH EVERY MORNING WITH 300MG TABLET(TOTAL DAILY DOSE 450)    SSRIs Protocol Passed    7/26/2018  9:56 AM       Passed - Recent (12 mo) or future (30 days) visit within the authorizing provider's specialty    Patient had office visit in the last 12 months or has a visit in the next 30 days with authorizing provider or within the authorizing provider's specialty.  See \"Patient Info\" tab in inbasket, or \"Choose Columns\" in Meds & Orders section of the refill encounter.           Passed - Medication is Bupropion    If the medication is Bupropion (Wellbutrin), and the patient is taking for smoking cessation; OK to refill.         Passed - Patient is age 18 or older        Maribel Coronado RN  ....................  7/30/2018   12:19 PM      "

## 2018-11-01 DIAGNOSIS — F41.9 ANXIETY AND DEPRESSION: ICD-10-CM

## 2018-11-01 DIAGNOSIS — F32.A ANXIETY AND DEPRESSION: ICD-10-CM

## 2018-11-05 NOTE — TELEPHONE ENCOUNTER
Richi RAMEY sent Rx request for the following:     BUPROPION XL 150MG TABLETS (24 H)  TAKE 1 TABLET BY MOUTH EVERY MORNING WITH 300MG TABLET  Last Written Prescription Date:  7/30/18  Last Fill Quantity: 30,   # refills: 2  Last Office Visit: 11/7/17 (J)  Future Office visit: None.    Per refill encounter, dated  7/26, 90-day supply given, as Patient would be due for annual exam, in November.    Unable to reach Patient to assist them in scheduling appointment. Writer will route to PCP for refill consideration. Unable to complete prescription refill per RN Medication Refill Policy. Marleny Brannon RN .............. 11/6/2018  3:42 PM

## 2018-11-06 RX ORDER — BUPROPION HYDROCHLORIDE 150 MG/1
TABLET ORAL
Qty: 30 TABLET | Refills: 0 | Status: SHIPPED | OUTPATIENT
Start: 2018-11-06 | End: 2018-12-05

## 2018-12-02 DIAGNOSIS — F32.A DEPRESSION, UNSPECIFIED DEPRESSION TYPE: Primary | ICD-10-CM

## 2018-12-02 DIAGNOSIS — F41.9 ANXIETY AND DEPRESSION: ICD-10-CM

## 2018-12-02 DIAGNOSIS — F32.A ANXIETY AND DEPRESSION: ICD-10-CM

## 2018-12-02 NOTE — LETTER
December 5, 2018      Demarco Epps    [address] [facility] [address]        Dear Demarco,     This letter is to remind you that you are due for your annual exam with Rafa Chamberlain. Your last comprehensive visit was more than 12 months ago.    Please call the clinic at [phone] to schedule your appointment.    If you should require additional refills before your scheduled appointment, please contact your pharmacy and we will refill your medication until the date of your appointment.    If you are no longer seeing Rafa Chamberlain for primary care, please call to let us know. Doing so will remove you from our call/contact list.      Thank you for choosing [facility] for your health care needs.    Sincerely,    Refill RN  [facility]

## 2018-12-05 PROBLEM — F32.A DEPRESSION, UNSPECIFIED DEPRESSION TYPE: Status: ACTIVE | Noted: 2018-12-05

## 2018-12-05 RX ORDER — BUPROPION HYDROCHLORIDE 150 MG/1
TABLET ORAL
Qty: 15 TABLET | Refills: 0 | Status: SHIPPED | OUTPATIENT
Start: 2018-12-05 | End: 2019-02-28

## 2018-12-05 RX ORDER — FLUTICASONE PROPIONATE 50 MCG
1 SPRAY, SUSPENSION (ML) NASAL
COMMUNITY
Start: 2017-11-07 | End: 2019-02-28

## 2018-12-05 NOTE — TELEPHONE ENCOUNTER
Needs to come in to see an available provider and should establish care given my upcoming departure.

## 2018-12-05 NOTE — TELEPHONE ENCOUNTER
Message left for patient to call back. Message also left at pharmacy for patient regarding the need for a follow up visit.

## 2018-12-05 NOTE — TELEPHONE ENCOUNTER
Richi RAMEY sent Rx request for the following:     BUPROPION XL 300MG TABLETS  TAKE 1 TABLET BY MOUTH EVERY MORNING ALONG WITH THE 150MG TABLET  Last Written Prescription Date:  4/27/18  Last Fill Quantity: 90,   # refills: 1    Last Office Visit: 11/7/17  Future Office visit: None.    Routing refill request to provider for review/approval because:  SSRIs Protocol Vysbat25/5 9:11 AM   PHQ-9 score less than 5 in past 6 months    Recent (6 mo) or future (30 days) visit within the authorizing provider's specialty     Patient overdue for 6-month check-up and annual exam. Unable to reach Patient to assist him in scheduling appointment. Reminder letter sent. Will route to PCP for refill consideration. Unable to complete prescription refill per RN Medication Refill Policy. Marleny Brannon RN .............. 12/5/2018  9:40 AM

## 2018-12-06 DIAGNOSIS — F32.A DEPRESSION: ICD-10-CM

## 2018-12-06 RX ORDER — BUPROPION HYDROCHLORIDE 300 MG/1
TABLET ORAL
Qty: 15 TABLET | Refills: 0 | OUTPATIENT
Start: 2018-12-06

## 2018-12-06 NOTE — TELEPHONE ENCOUNTER
Several failed attempts to reach Patient with this information. Reminder letter sent and message to pharmacy. Writer will close encounter at this time and follow up as needed. Marleny Brannon RN .............. 12/6/2018  11:47 AM

## 2018-12-07 RX ORDER — BUPROPION HYDROCHLORIDE 300 MG/1
300 TABLET ORAL
Qty: 90 TABLET | OUTPATIENT
Start: 2018-12-07

## 2018-12-07 NOTE — TELEPHONE ENCOUNTER
Last OV 11/7/17 with PCP. BuPROPion (WELLBUTRIN XL) 300 MG 24 hr tablet will be rufused at this time.  Patient was given a aleksandra refill 12/02/18 and notified by letter any additional refills will require an appointment with a provider.     Katarzyna Del Castillo RN on 12/7/2018 at 2:01 PM

## 2019-02-11 ENCOUNTER — TELEPHONE (OUTPATIENT)
Dept: FAMILY MEDICINE | Facility: OTHER | Age: 29
End: 2019-02-11

## 2019-02-11 DIAGNOSIS — H93.13 TINNITUS, BILATERAL: Primary | ICD-10-CM

## 2019-02-11 NOTE — TELEPHONE ENCOUNTER
Patient needs to make an appointment with an available provider for evaluation and to establish care as I will only be working in clinic for a handful more days, and I have not seen him in well over a year and never for this concern.

## 2019-02-11 NOTE — TELEPHONE ENCOUNTER
Patient calling in regards to having tinnitus is both ears. He states it was recommended by  insurance program to get a hearing test. Dr. Chamberlain has not seen him for this issue. He would like Dr. Chamberlain to place a referral. Patient is aware that Dr. Chamberlain is out of the office and will address this tomorrow when he returns.     Julia Warner LPN...................2/11/2019  1:12 PM

## 2019-02-11 NOTE — TELEPHONE ENCOUNTER
Left detailed message stating message below and provided number to call for appointment or questions.   Julia Warner LPN...................2/11/2019  1:53 PM

## 2019-02-28 ENCOUNTER — OFFICE VISIT (OUTPATIENT)
Dept: FAMILY MEDICINE | Facility: OTHER | Age: 29
End: 2019-02-28
Attending: FAMILY MEDICINE
Payer: COMMERCIAL

## 2019-02-28 VITALS
BODY MASS INDEX: 29.28 KG/M2 | HEIGHT: 68 IN | DIASTOLIC BLOOD PRESSURE: 84 MMHG | SYSTOLIC BLOOD PRESSURE: 128 MMHG | TEMPERATURE: 98.1 F | RESPIRATION RATE: 16 BRPM | WEIGHT: 193.2 LBS | HEART RATE: 92 BPM

## 2019-02-28 DIAGNOSIS — H93.13 TINNITUS, BILATERAL: Primary | ICD-10-CM

## 2019-02-28 DIAGNOSIS — G47.00 PERSISTENT INSOMNIA: ICD-10-CM

## 2019-02-28 PROCEDURE — G0463 HOSPITAL OUTPT CLINIC VISIT: HCPCS

## 2019-02-28 PROCEDURE — 99213 OFFICE O/P EST LOW 20 MIN: CPT | Performed by: FAMILY MEDICINE

## 2019-02-28 RX ORDER — TRAZODONE HYDROCHLORIDE 50 MG/1
50 TABLET, FILM COATED ORAL AT BEDTIME
Qty: 9030 TABLET | Refills: 1 | Status: SHIPPED | OUTPATIENT
Start: 2019-02-28 | End: 2022-04-18

## 2019-02-28 ASSESSMENT — MIFFLIN-ST. JEOR: SCORE: 1807.91

## 2019-02-28 ASSESSMENT — PATIENT HEALTH QUESTIONNAIRE - PHQ9: SUM OF ALL RESPONSES TO PHQ QUESTIONS 1-9: 0

## 2019-02-28 ASSESSMENT — PAIN SCALES - GENERAL: PAINLEVEL: MILD PAIN (3)

## 2019-02-28 NOTE — PROGRESS NOTES
"  SUBJECTIVE:   Demarco Epps is a 28 year old male who presents to clinic today for the following health issues: Hearing aid referral    Patient arrives here for a hearing aid referral.  He was recently at [facility] getting a  physical when he advised him he failed his hearing testing.  Also is requesting something to get for sleeping.  He has had trouble sleeping for a month.  He is tried over-the-counter medication including Unisom without any improvement.  He also reports is been having a lot of tinnitus.  Bilateral.          Patient Active Problem List    Diagnosis Date Noted     Tinnitus, bilateral 02/28/2019     Priority: Medium     Persistent insomnia 02/28/2019     Priority: Medium     Depression, unspecified depression type 12/05/2018     Priority: Medium     Past Medical History:   Diagnosis Date     Personal history of other medical treatment (CODE)     No Comments Provided      Past Surgical History:   Procedure Laterality Date     OTHER SURGICAL HISTORY      [id number],NO PAST SURGERIES     Current Outpatient Medications   Medication Sig Dispense Refill     diphenhydrAMINE HCl, Sleep, (UNISOM SLEEPGELS) 50 MG CAPS        traZODone (DESYREL) 50 MG tablet Take 1 tablet (50 mg) by mouth At Bedtime Prn 9030 tablet 1     No Known Allergies    Review of Systems     OBJECTIVE:     /84   Pulse 92   Temp 98.1  F (36.7  C)   Resp 16   Ht 1.715 m (5' 7.5\")   Wt 87.6 kg (193 lb 3.2 oz)   BMI 29.81 kg/m    Body mass index is 29.81 kg/m .  Physical Exam   Constitutional: He appears well-developed.   HENT:   Head: Normocephalic.   Cardiovascular: Normal rate, regular rhythm and normal heart sounds.   Pulmonary/Chest: Effort normal.   Neurological: He is alert.       none     ASSESSMENT/PLAN:         1. Tinnitus, bilateral    - AUDIOLOGY ADULT REFERRAL    2. Persistent insomnia  Also discussed sleep hygiene.  - traZODone (DESYREL) 50 MG tablet; Take 1 tablet (50 mg) by mouth At Bedtime Prn  Dispense: " 9030 tablet; Refill: 1        Rickey Domingo MD  [facility]

## 2019-02-28 NOTE — NURSING NOTE
Patient here for request for hearing refferal requested by the . He has conccerns about sleep medications and back pain that he takes a lot of ibuprofen for. Medication Reconciliation: complete.    Lisa Sullivan LPN  2/28/2019 1:41 PM

## 2019-03-18 ENCOUNTER — TELEPHONE (OUTPATIENT)
Dept: FAMILY MEDICINE | Facility: OTHER | Age: 29
End: 2019-03-18

## 2019-03-18 NOTE — TELEPHONE ENCOUNTER
Patient would like a referral placed to seeing an audiologist at [facility].     Johanna Floyd on 3/18/2019 at 11:43 AM

## 2019-03-18 NOTE — TELEPHONE ENCOUNTER
After verifying pts name and date of birth with pt, pt would like a referral for audiologist at  in [place]. Pt state they are the only one who will take his insurance other than . [facility] and he wont be able to get into them anytime soon.  Ivory Rich

## 2019-03-26 ENCOUNTER — TRANSFERRED RECORDS (OUTPATIENT)
Dept: HEALTH INFORMATION MANAGEMENT | Facility: OTHER | Age: 29
End: 2019-03-26

## 2022-03-03 LAB
ALT SERPL-CCNC: 28 U/L (ref 13–61)
AST SERPL-CCNC: 30 U/L (ref 15–37)
CHOLESTEROL (EXTERNAL): 258 MG/DL (ref 0–200)
CREATININE (EXTERNAL): 0.8 MG/DL (ref 0.7–1.2)
GFR ESTIMATED (EXTERNAL): >90 ML/MIN/1.73M2
GLUCOSE (EXTERNAL): 86 MG/DL (ref 74–106)
HDLC SERPL-MCNC: 43 MG/DL
HEP C HIM: NORMAL
LDL CHOLESTEROL CALCULATED (EXTERNAL): 182 MG/DL
NON HDL CHOLESTEROL (EXTERNAL): 215 MG/DL
POTASSIUM (EXTERNAL): 4.4 MMOL/L (ref 3.5–5)
TRIGLYCERIDES (EXTERNAL): 163 MG/DL
TSH SERPL-ACNC: 0.93 UIU/ML (ref 0.35–4.94)

## 2022-04-15 NOTE — PROGRESS NOTES
Assessment & Plan   1. Cervical radiculopathy  Presented today for upper and lower back pain. Referral sent to PT at patient request. Managing pain well at this time at home. Discussed getting back into chiropractor until PT referral can get completed. [facility] [place] [facility] our [facility].     Will follow up if new or worsening symptoms arise.     2. Chronic low back pain, unspecified back pain laterality, unspecified whether sciatica present  See #1.     JOSE L PierceS  I was present with the student who participated in the service and in the documentation of this note.  I have verified the history and personally performed a physical exam and medical decision making, and have verified the content of the note, which accurately reflects my assessment of the patient and the plan of care.    Guadalupe Charles PA-C  [facility] is a 31 year old who presents for the following health issues    HPI   History of chronic lower back pain due to arthritis and upper back pain. Moved here from [place] in February [facility] Imaging was completed down there, but does not have the records at this time. Pain in upper back with non-specific movements causing numbness and tingling down bilateral arms. Numbness starts at the base of thumb and works up flows in the radial nerve distribution. Was seeing chiropractic in the past, which helped. Currently takes gabapentin at night for pain and cyclobenzaprine as needed. Uses a foam roller as well to stretch. Looking for PT referral at this time.         PAST MEDICAL HISTORY:   Past Medical History:   Diagnosis Date     Personal history of other medical treatment (CODE)     No Comments Provided       PAST SURGICAL HISTORY:   Past Surgical History:   Procedure Laterality Date     OTHER SURGICAL HISTORY      [id number],NO PAST SURGERIES       FAMILY HISTORY: No family history on  "file.    SOCIAL HISTORY:   Social History     Tobacco Use     Smoking status: Former Smoker     Years: 10.00     Types: Cigars, Other     Start date: 11/1/2015     Smokeless tobacco: Former User     Types: Chew     Tobacco comment: Quit smoking: vape   Substance Use Topics     Alcohol use: Yes     Alcohol/week: 12.0 standard drinks     Comment: 12 a month       No Known Allergies  Current Outpatient Medications   Medication     cyclobenzaprine (FLEXERIL) 10 MG tablet     gabapentin (NEURONTIN) 100 MG capsule     PARoxetine (PAXIL) 30 MG tablet     No current facility-administered medications for this visit.         Review of Systems   Constitutional: Negative for chills, fatigue and fever.   Respiratory: Negative for cough, shortness of breath, wheezing and stridor.    Cardiovascular: Negative for chest pain and palpitations.   Musculoskeletal: Positive for arthralgias and back pain.   Neurological: Positive for numbness.           Objective    /74   Pulse 81   Temp 96.8  F (36  C)   Resp 14   Ht 1.727 m (5' 8\")   Wt 90.9 kg (200 lb 6.4 oz)   SpO2 97%   BMI 30.47 kg/m    Body mass index is 30.47 kg/m .  Physical Exam   General: Pleasant, in no apparent distress.  Cardiovascular: Regular rate and rhythm with S1 equal to S2. No murmurs, friction rubs, or gallops.   Respiratory: Lungs are resonant and clear to auscultation bilaterally. No wheezes, crackles, or rhonchi.  Musculoskeletal: Back is straight, no tenderness to palpation of paraspinal and paravertebral muscles. Full ROM of back, neck, upper extremity.   Skin: No jaundice, pallor, rashes, or lesions.  Psych: Appropriate mood and affect.          "

## 2022-04-18 ENCOUNTER — OFFICE VISIT (OUTPATIENT)
Dept: FAMILY MEDICINE | Facility: OTHER | Age: 32
End: 2022-04-18
Attending: PHYSICIAN ASSISTANT
Payer: MEDICAID

## 2022-04-18 VITALS
OXYGEN SATURATION: 97 % | TEMPERATURE: 96.8 F | HEIGHT: 68 IN | HEART RATE: 81 BPM | DIASTOLIC BLOOD PRESSURE: 74 MMHG | WEIGHT: 200.4 LBS | RESPIRATION RATE: 14 BRPM | BODY MASS INDEX: 30.37 KG/M2 | SYSTOLIC BLOOD PRESSURE: 112 MMHG

## 2022-04-18 DIAGNOSIS — M54.12 CERVICAL RADICULOPATHY: Primary | ICD-10-CM

## 2022-04-18 DIAGNOSIS — M54.50 CHRONIC LOW BACK PAIN, UNSPECIFIED BACK PAIN LATERALITY, UNSPECIFIED WHETHER SCIATICA PRESENT: ICD-10-CM

## 2022-04-18 DIAGNOSIS — G89.29 CHRONIC LOW BACK PAIN, UNSPECIFIED BACK PAIN LATERALITY, UNSPECIFIED WHETHER SCIATICA PRESENT: ICD-10-CM

## 2022-04-18 PROCEDURE — G0463 HOSPITAL OUTPT CLINIC VISIT: HCPCS

## 2022-04-18 PROCEDURE — 99203 OFFICE O/P NEW LOW 30 MIN: CPT | Performed by: PHYSICIAN ASSISTANT

## 2022-04-18 RX ORDER — PAROXETINE 30 MG/1
30 TABLET, FILM COATED ORAL AT BEDTIME
COMMUNITY

## 2022-04-18 RX ORDER — CYCLOBENZAPRINE HCL 10 MG
10 TABLET ORAL DAILY
COMMUNITY

## 2022-04-18 RX ORDER — GABAPENTIN 100 MG/1
100 CAPSULE ORAL 2 TIMES DAILY
COMMUNITY

## 2022-04-18 ASSESSMENT — ENCOUNTER SYMPTOMS
BACK PAIN: 1
COUGH: 0
FEVER: 0
FATIGUE: 0
CHILLS: 0
SHORTNESS OF BREATH: 0
ARTHRALGIAS: 1
WHEEZING: 0
STRIDOR: 0
PALPITATIONS: 0
NUMBNESS: 1

## 2022-04-18 ASSESSMENT — PAIN SCALES - GENERAL: PAINLEVEL: MILD PAIN (3)

## 2022-04-18 NOTE — NURSING NOTE
Patient presents to clinic with chronic back pain. He states that he has arthritis in his back and seeing a chiropractor in the past has helped. Has not seen one since moving to [place] in February.  Roxane Leon LPN ....................  4/18/2022   8:08 AM

## 2022-05-17 ENCOUNTER — HOSPITAL ENCOUNTER (OUTPATIENT)
Dept: PHYSICAL THERAPY | Facility: OTHER | Age: 32
Setting detail: THERAPIES SERIES
Discharge: HOME | End: 2022-05-17
Attending: PHYSICIAN ASSISTANT
Payer: MEDICAID

## 2022-05-17 DIAGNOSIS — M54.50 CHRONIC LOW BACK PAIN: ICD-10-CM

## 2022-05-17 DIAGNOSIS — M54.12 CERVICAL RADICULOPATHY: ICD-10-CM

## 2022-05-17 DIAGNOSIS — G89.29 CHRONIC LOW BACK PAIN: ICD-10-CM

## 2022-05-17 PROCEDURE — 97161 PT EVAL LOW COMPLEX 20 MIN: CPT | Mod: GP

## 2022-05-17 PROCEDURE — 97140 MANUAL THERAPY 1/> REGIONS: CPT | Mod: GP

## 2022-05-17 PROCEDURE — 97110 THERAPEUTIC EXERCISES: CPT | Mod: GP

## 2022-05-18 NOTE — PROGRESS NOTES
05/17/22 1400   General Information   Type of Visit Initial OP Ortho PT Evaluation   Start of Care Date 05/18/22   Referring Physician Guadalupe Charles PA-C   Patient/Family Goals Statement to have relief and reduction from pain.   Orders Evaluate and Treat   Date of Order 04/28/22   Certification Required? Yes   Medical Diagnosis Chronic low back pain  Cervical radiculopathy   Surgical/Medical history reviewed Yes   Precautions/Limitations no known precautions/limitations   General Information Comments PMH: tinnitus, depression, persistent insomnia, PTSD   Body Part(s)   Body Part(s) Cervical Spine   Presentation and Etiology   Pertinent history of current problem (include personal factors and/or comorbidities that impact the POC) Patient reports that he has been feeling symptoms intermittently in neck, mid back and low back for several years (since 2017), but notes increased symptoms especially in mid back in last several months. Has had chiropractic services in the past which were beneficial. Went to chiropractic services recently and did not get desired response. Notes that he gets bilateral UE pain and numbness and tingling in radicular pattern. UE symptoms start in upper shoulders and extend down elbows into bilateral thumbs and wrists. Reports no significant difference between R and L UE. 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 Notes tension and pain in between shoulder blades and has HA that occur about every otherday starting at base of skull moving into head. Denies trauma or injury or surgery to back, mid back, neck and head. Has been getting good quality sleep lately with 6-7 hours a night. Does not some nights/mornings he wakes with numbness and tingling in arms.   Impairments A. Pain;E. Decreased flexibility;K. Numbness;J. Burning;N. Headaches;D. Decreased ROM   Functional Limitations perform  activities of daily living;perform required work activities;perform desired leisure / sports activities   Symptom Location Neck, mid back, UE radicular symptoms, low back   How/Where did it occur From insidious onset   Onset date of current episode/exacerbation   (increased in past several months, chronic issue)   Chronicity Chronic   Pain rating (0-10 point scale) Best (/10);Worst (/10)   Best (/10) 4   Worst (/10) 10   Pain quality A. Sharp;B. Dull;C. Aching;D. Burning   Frequency of pain/symptoms A. Constant   Pain/symptoms exacerbated by D. Carrying;H. Overhead reach;I. Bending   Pain/symptoms eased by A. Sitting;C. Rest;I. OTC medication(s)   Progression of symptoms since onset: Worsened   Prior Level of Function   Prior Level of Function-Mobility Able to perform desired tasks at home and work, but limited by pain after or notes increased pain during.   Current Level of Function   Patient role/employment history A. Employed   Employment Comments Key   Fall Risk Screen   Have you fallen 2 or more times in the past year? No   Have you fallen and had an injury in the past year? No   Is patient a fall risk? No   Cervical Spine   Cervical Left Side Bending ROM Minor limitaion, muscle tension present   Cervical Right Rotation ROM Full motion, mild tension   Cervical Left Rotation ROM Full motion, mild tension   Cervical Flexion ROM Major limitation, tension at cervical parapsinals extended into CTJ and periscap region   Cervical Extension ROM Full, pain   Cervical Right Side Bending ROM Minor limitaion, muscle tension present   Thoracic Flexion ROM Full flexion noted mild to moderate myofascial tension noted.   Thoracic Extension ROM Segmental hypomobility present during extension. Most restricted regions are T2-T4 and T7-T9.   Thoracic Right Rotation Moderate limitation, pain on R.   Thoracic Left Rotation Moderate limitation, pain on L.   Shoulder AROM Screen Full motion bilaterally.    Cervical/Thoracic/Shoulder ROM Comments See above.   Shoulder Shrug (C2-C4) Strength 5   Shoulder Abd (C5) Strength 5   Shoulder Add (C7) Strength 5   Shoulder ER (C5, C6) Strength 5   Shoulder IR (C5, C6) Strength 5   Elbow Flexion (C5, C6) Strength 5   Shoulder/Wrist/Hand Strength Comments  strength 100# R, 100# L. Patient is R hand dominant.   Upper Trapezius Flexibility Tension present bilaterally.   Levator Scapula Flexibility Tension present bilaterally.   Palpation Suboccipital tension present, greater on L compared to R.   Posture Decreased cervical lordosis, moderately rounded shoulders   Planned Therapy Interventions   Planned Therapy Interventions manual therapy;ROM;strengthening;stretching;joint mobilization   Planned Therapy Interventions Comment Manual techniques and therapeutic exercise to restore mobility and segmental health, home program development, patient education.   Planned Modality Interventions   Planned Modality Interventions Cryotherapy;Hot packs;Traction   Planned Modality Interventions Comments as needed   Clinical Impression   Criteria for Skilled Therapeutic Interventions Met yes, treatment indicated   PT Diagnosis Patient presents with multi region pain and tension symptoms including cervical, thoracic and lumbar regions creating pain with functional tasks at home and work.   Influenced by the following impairments pain, muscle tension, UE radicular symptoms, segmental hypomobility   Functional limitations due to impairments Pain during work tasks of lifting, carrying, repetitive UE activities   Clinical Presentation Stable/Uncomplicated   Clinical Presentation Rationale Clinical judgement   Clinical Decision Making (Complexity) Low complexity   Therapy Frequency 2 times/Week   Predicted Duration of Therapy Intervention (days/wks) 90 days'   Risk & Benefits of therapy have been explained Yes   Patient, Family & other staff in agreement with plan of care Yes   Clinical Impression  Comments Patient presents with multiple regions of pain with mid thoraqcic and cervical currently causing the most severe symptoms and LB more chronic and lower intensity. Patient noted UE symptoms in radicular pattern. Has HA with significnat suboccipital tension. Thoracic segemental hypomobility noted, decreased cervical flexion with tension extending from neck to periscap region. Patient is a good candidate for PT services. Will initiate treatment to thoracic and suboccipital region including cervical and lumbar exercises over episode of care.   Education Assessment   Barriers to Learning No barriers   ORTHO GOALS   PT Ortho Eval Goals 1;2;3;4   Ortho Goal 1   Goal Identifier STG 1 - HEP   Goal Description Patient will be independent and compliant with HEP.   Target Date 06/01/22   Ortho Goal 2   Goal Identifier LTG -1 Overhead reaching, lifting, carrying   Goal Progress Patient will be able to perform overhead reaching, lifting, carrying and repetitive UE tasks with sinificant improvement in cervical, thoracic, UE and lumbar symptoms without limitation from pain during or after.   Target Date 08/15/22   Ortho Goal 3   Goal Identifier LTG -2 Sleep   Goal Description Patient will be able to consistently sleep without waking with numbness, tingling and UE pain.   Target Date 08/15/22   Ortho Goal 4   Goal Identifier LTG - 3 Pain Reduction   Goal Description Patient will report a 50% or greater improvement in pain at home, work and community.   Target Date 08/15/22   Total Evaluation Time   PT Eval, Low Complexity Minutes (32832) 25   Therapy Certification   Certification date from 05/18/22   Certification date to 08/15/22   Medical Diagnosis Chronic low back pain  Cervical radiculopathy

## 2022-05-18 NOTE — PROGRESS NOTES
[facility]    OUTPATIENT PHYSICAL THERAPY ORTHOPEDIC EVALUATION  PLAN OF TREATMENT FOR OUTPATIENT REHABILITATION  (COMPLETE FOR INITIAL CLAIMS ONLY)  Patient's Last Name, First Name, M.I.  YOB: 1990  Demarco Epps    Provider s Name:  [facility]   Medical Record No.  [id number]   Start of Care Date:  05/18/22   Onset Date:   (increased in past several months, chronic issue)   Type:     _X__PT   ___OT   ___SLP Medical Diagnosis:  Chronic low back pain  Cervical radiculopathy     PT Diagnosis:  Patient presents with multi region pain and tension symptoms including cervical, thoracic and lumbar regions creating pain with functional tasks at home and work.   Visits from SOC:  1      _________________________________________________________________________________  Plan of Treatment/Functional Goals:  manual therapy, ROM, strengthening, stretching, joint mobilization  Manual techniques and therapeutic exercise to restore mobility and segmental health, home program development, patient education.  Cryotherapy, Hot packs, Traction  as needed  Goals  Goal Identifier: STG 1 - HEP  Goal Description: Patient will be independent and compliant with HEP.  Target Date: 06/01/22    Goal Identifier: LTG -1 Overhead reaching, lifting, carrying     Target Date: 08/15/22    Goal Identifier: LTG -2 Sleep  Goal Description: Patient will be able to consistently sleep without waking with numbness, tingling and UE pain.  Target Date: 08/15/22    Goal Identifier: LTG - 3 Pain Reduction  Goal Description: Patient will report a 50% or greater improvement in pain at home, work and community.  Target Date: 08/15/22               Therapy Frequency:  2 times/Week  Predicted Duration of Therapy Intervention:  90 days    Marilee Moe, PT                 I CERTIFY THE NEED FOR THESE SERVICES  FURNISHED UNDER        THIS PLAN OF TREATMENT AND WHILE UNDER MY CARE     (Physician co-signature of this document indicates review and certification of the therapy plan).                     Certification Date From:  05/18/22   Certification Date To:  08/15/22    Referring Provider:  Guadalupe Charles PA-C    Initial Assessment        See Epic Evaluation Start of Care Date: 05/18/22

## 2022-05-26 ENCOUNTER — HOSPITAL ENCOUNTER (OUTPATIENT)
Dept: PHYSICAL THERAPY | Facility: OTHER | Age: 32
Setting detail: THERAPIES SERIES
Discharge: HOME | End: 2022-05-26
Attending: PHYSICIAN ASSISTANT
Payer: MEDICAID

## 2022-05-26 PROCEDURE — 97140 MANUAL THERAPY 1/> REGIONS: CPT | Mod: GP

## 2022-05-26 PROCEDURE — 97110 THERAPEUTIC EXERCISES: CPT | Mod: GP

## 2022-06-08 ENCOUNTER — HOSPITAL ENCOUNTER (OUTPATIENT)
Dept: PHYSICAL THERAPY | Facility: OTHER | Age: 32
Setting detail: THERAPIES SERIES
Discharge: HOME | End: 2022-06-08
Attending: PHYSICIAN ASSISTANT
Payer: COMMERCIAL

## 2022-06-08 PROCEDURE — 97110 THERAPEUTIC EXERCISES: CPT | Mod: GP

## 2022-06-08 PROCEDURE — 97140 MANUAL THERAPY 1/> REGIONS: CPT | Mod: GP

## 2022-06-10 NOTE — PROGRESS NOTES
Assessment & Plan     1. Depression, unspecified depression type  Patient with longstanding history of depression, continues on Paxil.  Patient reports he is 70% disabled due to history of PTSD through [facility] [place].  Patient has 2 emotional support cats that provided great benefit.  Paperwork completed for him to have emotional support cats in his current living facility.  Follow-up as needed.    Return if symptoms worsen or fail to improve.    Guadalupe Charles PA-C  [facility] is a 32 year old who presents for the following health issues     HPI   Here requesting emotional support animal letter.  Patient has a longstanding history of depression for which she continues on Paxil.  Per his report he [facility] [place] who diagnosed him a 70% disabled due to history of PTSD.  Patient has 2 emotional support cats that he significantly benefits from.  Previously had a letter from his prior provider in [place] allowing for them to have emotional support pet and their animal but they lost that in the moved to [place].  Patient is needing paperwork completed for his current [facility] to have his cats living with him.    PAST MEDICAL HISTORY:   Past Medical History:   Diagnosis Date     Personal history of other medical treatment (CODE)     No Comments Provided       PAST SURGICAL HISTORY:   Past Surgical History:   Procedure Laterality Date     OTHER SURGICAL HISTORY      [id number],NO PAST SURGERIES       FAMILY HISTORY: History reviewed. No pertinent family history.    SOCIAL HISTORY:   Social History     Tobacco Use     Smoking status: Former Smoker     Years: 10.00     Types: Cigars, Other     Start date: 11/1/2015     Smokeless tobacco: Former User     Types: Chew     Tobacco comment: Quit smoking: vape   Substance Use Topics     Alcohol use: Yes     Alcohol/week: 12.0 standard drinks     Comment: 12 a month       No Known Allergies  Current  "Outpatient Medications   Medication     cyclobenzaprine (FLEXERIL) 10 MG tablet     gabapentin (NEURONTIN) 100 MG capsule     PARoxetine (PAXIL) 30 MG tablet     No current facility-administered medications for this visit.         Review of Systems   Per HPI        Objective    /78   Pulse 68   Temp 97.4  F (36.3  C)   Resp 12   Ht 1.727 m (5' 8\")   Wt 87.1 kg (192 lb)   SpO2 96%   BMI 29.19 kg/m    Body mass index is 29.19 kg/m .  Physical Exam   General: Pleasant, in no apparent distress.  Psych: Appropriate mood and affect.            "

## 2022-06-13 ENCOUNTER — OFFICE VISIT (OUTPATIENT)
Dept: FAMILY MEDICINE | Facility: OTHER | Age: 32
End: 2022-06-13
Attending: PHYSICIAN ASSISTANT
Payer: COMMERCIAL

## 2022-06-13 VITALS
SYSTOLIC BLOOD PRESSURE: 124 MMHG | HEART RATE: 68 BPM | OXYGEN SATURATION: 96 % | RESPIRATION RATE: 12 BRPM | DIASTOLIC BLOOD PRESSURE: 78 MMHG | WEIGHT: 192 LBS | HEIGHT: 68 IN | TEMPERATURE: 97.4 F | BODY MASS INDEX: 29.1 KG/M2

## 2022-06-13 DIAGNOSIS — F32.A DEPRESSION, UNSPECIFIED DEPRESSION TYPE: Primary | ICD-10-CM

## 2022-06-13 PROCEDURE — 99212 OFFICE O/P EST SF 10 MIN: CPT | Performed by: PHYSICIAN ASSISTANT

## 2022-06-13 PROCEDURE — G0463 HOSPITAL OUTPT CLINIC VISIT: HCPCS

## 2022-06-13 ASSESSMENT — PAIN SCALES - GENERAL: PAINLEVEL: MODERATE PAIN (4)

## 2022-06-13 NOTE — NURSING NOTE
Patient presents to clinic wanting to discuss emotional support animal.  Roxane Leon LPN ....................  6/13/2022   3:08 PM

## 2022-06-14 ENCOUNTER — HOSPITAL ENCOUNTER (OUTPATIENT)
Dept: PHYSICAL THERAPY | Facility: OTHER | Age: 32
Setting detail: THERAPIES SERIES
Discharge: HOME | End: 2022-06-14
Attending: PHYSICIAN ASSISTANT
Payer: COMMERCIAL

## 2022-06-14 PROCEDURE — 97140 MANUAL THERAPY 1/> REGIONS: CPT | Mod: GP

## 2022-06-14 PROCEDURE — 97110 THERAPEUTIC EXERCISES: CPT | Mod: GP

## 2022-07-27 NOTE — PROGRESS NOTES
[facility]    Outpatient Physical Therapy Discharge Note  Patient: Demarco Epps  : 1990    Beginning/End Dates of Reporting Period:  22 to 22    Referring Provider: Guadalupe Charles PA-C    Therapy Diagnosis: Chronic low back pain  Cervical radiculopathy     Client Self Report: Pt reports he feels that the exercises are helping, he is still having some tenderness in his mid back.    Assessment: responding well to exercises, interrupted attendance.    Goals:  Goal Identifier STG 1 - HEP   Goal Description Patient will be independent and compliant with HEP.   Target Date 22   Date Met   22   Progress (detail required for progress note):       Goal Identifier LTG -1 Overhead reaching, lifting, carrying   Goal Description  Patient will be able to perform overhead reaching, lifting, carrying and repetitive UE tasks with sinificant improvement in cervical, thoracic, UE and lumbar symptoms without limitation from pain during or after.   Target Date 08/15/22   Date Met      Unable to determine, patient did not return after 22 visit.      Goal Identifier LTG -2 Sleep   Goal Description Patient will be able to consistently sleep without waking with numbness, tingling and UE pain.   Target Date 08/15/22   Date Met      Progress (detail required for progress note):  Unable to determine, patient did not return after 22 visit.      Goal Identifier LTG - 3 Pain Reduction   Goal Description Patient will report a 50% or greater improvement in pain at home, work and community.   Target Date 08/15/22   Date Met      Progress (detail required for progress note):  Unable to determine, patient did not return after 22 visit.      Goal Identifier     Goal Description     Target Date     Date Met      Progress (detail required for progress note):           HEP: self suboccipital release. 1x/day  + at first sign of headache. Supine cervical retraction + extension with towel or seated cervical and thoracic extension in seated. Frequency 6x/day. Standing thread the needle for scap mobility and thoracic rotation combo. 6x/day. Add 5/26: lumbar rotation stretch arms at T, seated forward flexion stretch. 6/8 - progression of thread the needle and added diagonal reach with fwd flexion stretch.    Plan:  Discharge from therapy.    Discharge:    Reason for Discharge: Patient has failed to schedule further appointments.    Equipment Issued: HEP    Discharge Plan: Patient to continue home program.

## 2022-11-07 ENCOUNTER — TRANSFERRED RECORDS (OUTPATIENT)
Dept: HEALTH INFORMATION MANAGEMENT | Facility: OTHER | Age: 32
End: 2022-11-07

## 2022-11-10 NOTE — PROGRESS NOTES
Assessment & Plan     1. Arthralgia of multiple joints  Chronic, progressing.  Vitals stable, exam with diffuse arthralgias.  Lab work pending as outlined below, will notify with results and treat if indicated.  Continue rheumatology referral based on results.  Neurology, discussed with patient management of diagnostic testing for this.  Continue with conservative management for now.  - CBC and Differential; Future  - CRP inflammation; Future  - Sedimentation Rate (ESR); Future  - Rheumatoid factor; Future  - Anti Nuclear Isela IgG by IFA with Reflex; Future  - Cyclic Citrullinated Peptide Antibody IgG; Future  - CBC and Differential  - CRP inflammation  - Sedimentation Rate (ESR)  - Rheumatoid factor  - Anti Nuclear Isela IgG by IFA with Reflex  - Cyclic Citrullinated Peptide Antibody IgG    2. Chronic low back pain, unspecified back pain laterality, unspecified whether sciatica present  Chronic, progressing.  Secondary to physical overuse while active in the .  Will await [facility] and pursue MRI for additional evaluation of soft tissue structures once results are received.  Consider possible follow-up physical therapy as patient has noted improvement with PT in the past. Continue with conservative management for now.     3. PTSD (post-traumatic stress disorder)  Chronic, stable. Discussed, emotional distress can present as physical symptoms as well.  Recommend establishing with a therapist for ongoing management of mental health diagnoses, PTSD.      No follow-ups on file.    Guadalupe Charles PA-C  [facility] AND [facility] is a 32 year old, presenting for the following health issues:  Back Pain      History of Present Illness       Back Pain:  He presents for follow up of back pain. Patient's back pain is a chronic problem.  Location of back pain:  Right lower back, left lower back, right middle of back, right upper back,  left upper back, right side of neck, left side of neck, right shoulder, left shoulder, right hip and right side of waist  Description of back pain: burning, dull ache and gnawing  Back pain spreads: right thigh, right shoulder, left shoulder, right side of neck and left side of neck    Since patient first noticed back pain, pain is: always present, but gets better and worse  Does back pain interfere with his job:  Yes      He eats 2-3 servings of fruits and vegetables daily.He consumes 2 sweetened beverage(s) daily.He exercises with enough effort to increase his heart rate 20 to 29 minutes per day.  He exercises with enough effort to increase his heart rate 3 or less days per week.   He is taking medications regularly.     Here for follow-up on chronic low back and neck pain, diffuse body aches and pains.  Patient reports history of chronic lumbar and cervical pain secondary to overuse while active in the  years ago.  Previously been evaluated for this by living in [place], unable to review these records.  More recently reports his low back pain has increased.  [facility], patient does not know results and unable to review results.  Patient wife notes that x-rays completed in 2019 showed arthritis and scoliosis.  Patient has responded well to physical therapy in the past.  He continues with gabapentin and cyclobenzaprine for symptomatic management.  Reports that he has been off of these medications for a while but was recommended to [facility] by the [facility] who [facility] on [facility].  Has tried chiropractic care with minimal improvement including acupuncture which flared symptoms further.    Patient also reports over the years and more recently he has noticing diffuse body aches and pains, multiple arthralgias.  Reports it seems like symptoms are worse on right compared to left but are throughout his entire body.  He is questioning possible  "fibromyalgia.  Has not previously been evaluated for the symptoms.  Of note patient does have history of mental health disorders, PTSD.  He continues on Paxil for management and feels like his mood has been well controlled.  He previously did follow with a mental health provider while he was living in [place] but has not yet established with a new mental health provider [place].        Review of Systems   Per HPI        Objective    /72   Pulse 96   Temp 98.3  F (36.8  C)   Resp 14   Ht 1.727 m (5' 8\")   Wt 89.4 kg (197 lb)   SpO2 98%   BMI 29.95 kg/m    Body mass index is 29.95 kg/m .  Physical Exam   General: Pleasant, in no apparent distress.  Cardiovascular: Regular rate and rhythm with S1 equal to S2. No murmurs, friction rubs, or gallops.   Respiratory: Lungs are resonant and clear to auscultation bilaterally. No wheezes, crackles, or rhonchi.  Musculoskeletal: Back is straight, tenderness throughout posterior cervical region, lower thoracic into middle lumbar region.  Full ROM of back, neck, upper and lower extremities.  Diffuse tenderness throughout shoulders, scapular region, worse on right.  Tenderness throughout bilateral hips.  Full range of motion of Upper and lower extremities bilaterally.  Nonantalgic gait in clinic.  Neurologic Exam: Normal gross motor, tone coordination and no visible tremor.  Skin: No jaundice, pallor, rashes, or lesions.  Psych: Appropriate mood and affect.    Results for orders placed or performed in visit on 11/11/22   CRP inflammation     Status: Normal   Result Value Ref Range    CRP Inflammation <3.00 <5.00 mg/L   Sedimentation Rate (ESR)     Status: Normal   Result Value Ref Range    Erythrocyte Sedimentation Rate 9 0 - 15 mm/hr   CBC with platelets and differential     Status: Abnormal   Result Value Ref Range    WBC Count 3.9 (L) 4.0 - 11.0 10e3/uL    RBC Count 4.42 4.40 - 5.90 10e6/uL    Hemoglobin 13.5 13.3 - 17.7 g/dL    Hematocrit 40.5 40.0 - 53.0 %    " MCV 92 78 - 100 fL    MCH 30.5 26.5 - 33.0 pg    MCHC 33.3 31.5 - 36.5 g/dL    RDW 12.3 10.0 - 15.0 %    Platelet Count 224 150 - 450 10e3/uL    % Neutrophils 48 %    % Lymphocytes 41 %    % Monocytes 8 %    % Eosinophils 2 %    % Basophils 1 %    % Immature Granulocytes 0 %    NRBCs per 100 WBC 0 <1 /100    Absolute Neutrophils 1.9 1.6 - 8.3 10e3/uL    Absolute Lymphocytes 1.6 0.8 - 5.3 10e3/uL    Absolute Monocytes 0.3 0.0 - 1.3 10e3/uL    Absolute Eosinophils 0.1 0.0 - 0.7 10e3/uL    Absolute Basophils 0.0 0.0 - 0.2 10e3/uL    Absolute Immature Granulocytes 0.0 <=0.4 10e3/uL    Absolute NRBCs 0.0 10e3/uL   CBC and Differential     Status: Abnormal    Narrative    The following orders were created for panel order CBC and Differential.  Procedure                               Abnormality         Status                     ---------                               -----------         ------                     CBC with platelets and d...[[id number]]  Abnormal            Final result                 Please view results for these tests on the individual orders.

## 2022-11-11 ENCOUNTER — OFFICE VISIT (OUTPATIENT)
Dept: FAMILY MEDICINE | Facility: OTHER | Age: 32
End: 2022-11-11
Attending: PHYSICIAN ASSISTANT
Payer: COMMERCIAL

## 2022-11-11 VITALS
WEIGHT: 197 LBS | RESPIRATION RATE: 14 BRPM | HEIGHT: 68 IN | OXYGEN SATURATION: 98 % | BODY MASS INDEX: 29.86 KG/M2 | HEART RATE: 96 BPM | SYSTOLIC BLOOD PRESSURE: 110 MMHG | TEMPERATURE: 98.3 F | DIASTOLIC BLOOD PRESSURE: 72 MMHG

## 2022-11-11 DIAGNOSIS — G89.29 CHRONIC LOW BACK PAIN, UNSPECIFIED BACK PAIN LATERALITY, UNSPECIFIED WHETHER SCIATICA PRESENT: ICD-10-CM

## 2022-11-11 DIAGNOSIS — M25.50 ARTHRALGIA OF MULTIPLE JOINTS: Primary | ICD-10-CM

## 2022-11-11 DIAGNOSIS — M54.50 CHRONIC LOW BACK PAIN, UNSPECIFIED BACK PAIN LATERALITY, UNSPECIFIED WHETHER SCIATICA PRESENT: ICD-10-CM

## 2022-11-11 DIAGNOSIS — F43.10 PTSD (POST-TRAUMATIC STRESS DISORDER): ICD-10-CM

## 2022-11-11 LAB
BASOPHILS # BLD AUTO: 0 10E3/UL (ref 0–0.2)
BASOPHILS NFR BLD AUTO: 1 %
CRP SERPL-MCNC: <3 MG/L
EOSINOPHIL # BLD AUTO: 0.1 10E3/UL (ref 0–0.7)
EOSINOPHIL NFR BLD AUTO: 2 %
ERYTHROCYTE [DISTWIDTH] IN BLOOD BY AUTOMATED COUNT: 12.3 % (ref 10–15)
ERYTHROCYTE [SEDIMENTATION RATE] IN BLOOD BY WESTERGREN METHOD: 9 MM/HR (ref 0–15)
HCT VFR BLD AUTO: 40.5 % (ref 40–53)
HGB BLD-MCNC: 13.5 G/DL (ref 13.3–17.7)
IMM GRANULOCYTES # BLD: 0 10E3/UL
IMM GRANULOCYTES NFR BLD: 0 %
LYMPHOCYTES # BLD AUTO: 1.6 10E3/UL (ref 0.8–5.3)
LYMPHOCYTES NFR BLD AUTO: 41 %
MCH RBC QN AUTO: 30.5 PG (ref 26.5–33)
MCHC RBC AUTO-ENTMCNC: 33.3 G/DL (ref 31.5–36.5)
MCV RBC AUTO: 92 FL (ref 78–100)
MONOCYTES # BLD AUTO: 0.3 10E3/UL (ref 0–1.3)
MONOCYTES NFR BLD AUTO: 8 %
NEUTROPHILS # BLD AUTO: 1.9 10E3/UL (ref 1.6–8.3)
NEUTROPHILS NFR BLD AUTO: 48 %
NRBC # BLD AUTO: 0 10E3/UL
NRBC BLD AUTO-RTO: 0 /100
PLATELET # BLD AUTO: 224 10E3/UL (ref 150–450)
RBC # BLD AUTO: 4.42 10E6/UL (ref 4.4–5.9)
WBC # BLD AUTO: 3.9 10E3/UL (ref 4–11)

## 2022-11-11 PROCEDURE — 85652 RBC SED RATE AUTOMATED: CPT | Mod: ZL | Performed by: PHYSICIAN ASSISTANT

## 2022-11-11 PROCEDURE — 99214 OFFICE O/P EST MOD 30 MIN: CPT | Performed by: PHYSICIAN ASSISTANT

## 2022-11-11 PROCEDURE — 86200 CCP ANTIBODY: CPT | Mod: ZL | Performed by: PHYSICIAN ASSISTANT

## 2022-11-11 PROCEDURE — G0463 HOSPITAL OUTPT CLINIC VISIT: HCPCS

## 2022-11-11 PROCEDURE — 86431 RHEUMATOID FACTOR QUANT: CPT | Mod: ZL | Performed by: PHYSICIAN ASSISTANT

## 2022-11-11 PROCEDURE — 86038 ANTINUCLEAR ANTIBODIES: CPT | Mod: ZL | Performed by: PHYSICIAN ASSISTANT

## 2022-11-11 PROCEDURE — 86140 C-REACTIVE PROTEIN: CPT | Mod: ZL | Performed by: PHYSICIAN ASSISTANT

## 2022-11-11 PROCEDURE — 85018 HEMOGLOBIN: CPT | Mod: ZL | Performed by: PHYSICIAN ASSISTANT

## 2022-11-11 PROCEDURE — 36415 COLL VENOUS BLD VENIPUNCTURE: CPT | Mod: ZL | Performed by: PHYSICIAN ASSISTANT

## 2022-11-11 ASSESSMENT — PAIN SCALES - GENERAL: PAINLEVEL: SEVERE PAIN (6)

## 2022-11-11 NOTE — NURSING NOTE
Patient presents to clinic to discuss pain that he has over his body and would like to be check for fibromyalgia.    Roxane Leon LPN ....................  11/11/2022   3:00 PM

## 2022-11-14 LAB
ANA SER QL IF: NEGATIVE
RHEUMATOID FACT SER NEPH-ACNC: <6 IU/ML

## 2022-11-15 ENCOUNTER — TELEPHONE (OUTPATIENT)
Dept: FAMILY MEDICINE | Facility: OTHER | Age: 32
End: 2022-11-15

## 2022-11-15 LAB — CCP AB SER IA-ACNC: 0.7 U/ML

## 2022-11-15 NOTE — TELEPHONE ENCOUNTER
Patient returned call to [place].  He states he has MyChart now.  He will look for a message on [employer].    Terrie Pagan on 11/15/2022 at 2:27 PM

## 2022-12-02 DIAGNOSIS — M54.2 NECK PAIN: ICD-10-CM

## 2022-12-02 DIAGNOSIS — G44.209 TENSION TYPE HEADACHE: Primary | ICD-10-CM

## 2023-01-15 ENCOUNTER — HEALTH MAINTENANCE LETTER (OUTPATIENT)
Age: 33
End: 2023-01-15

## 2023-04-19 ENCOUNTER — TRANSFERRED RECORDS (OUTPATIENT)
Dept: HEALTH INFORMATION MANAGEMENT | Facility: OTHER | Age: 33
End: 2023-04-19
Payer: COMMERCIAL

## 2023-04-19 LAB
ALT SERPL-CCNC: 36 U/L (ref 13–61)
AST SERPL-CCNC: 32 U/L (ref 15–37)
CHOLESTEROL (EXTERNAL): 313 MG/DL (ref 0–200)
CREATININE (EXTERNAL): 1 MG/DL (ref 0.7–1.2)
GFR ESTIMATED (EXTERNAL): >90 ML/MIN/1.73M2
GLUCOSE (EXTERNAL): 121 MG/DL (ref 74–106)
HBA1C MFR BLD: 5.2 % (ref 4–6)
HDLC SERPL-MCNC: 52 MG/DL
LDL CHOLESTEROL CALCULATED (EXTERNAL): 225 MG/DL
NON HDL CHOLESTEROL (EXTERNAL): 261 MG/DL
POTASSIUM (EXTERNAL): 4.4 MMOL/L (ref 3.5–5)
TRIGLYCERIDES (EXTERNAL): 180 MG/DL

## 2023-04-21 ENCOUNTER — TELEPHONE (OUTPATIENT)
Dept: FAMILY MEDICINE | Facility: OTHER | Age: 33
End: 2023-04-21
Payer: COMMERCIAL

## 2023-04-21 NOTE — TELEPHONE ENCOUNTER
Last OV 11/2022 will need appt to order MRI.   Can schedule call and offer appt.  Christa Castaneda LPN .............4/21/2023     2:06 PM

## 2023-04-21 NOTE — TELEPHONE ENCOUNTER
[facility] and wanted to get an [facility] done.  Does he need an appointment to get a referral for MRI?

## 2023-04-26 NOTE — PROGRESS NOTES
Assessment & Plan     1. Chronic bilateral thoracic back pain  Chronic, progressing.  Has been told previously symptoms are secondary to physical overuse while active in the  years ago.  Release of information form completed to obtain [facility] to [facility] previous and most recent x-ray results.  We will pursue MRI for additional evaluation due to symptoms progression.  Continue to manage with conservative management for now.  - MR Thoracic Spine w/o Contrast; Future    2. Chronic neck pain  3. Cervical radiculopathy  Chronic, progressing.  As above has been told symptoms are likely secondary to physical overuse while in the .  We will review outside the results once they are received.  Order for MRI placed to be completed once those results have been received.  Continue with conservative management for now.  - MR Cervical Spine w/o Contrast; Future      No follow-ups on file.    Guadalupe Charles PA-C  [facility]   Demarco is a 32 year old, presenting for the following health issues:  Back Pain      History of Present Illness       Reason for visit:  Mri    He eats 0-1 servings of fruits and vegetables daily.He consumes 2 sweetened beverage(s) daily.He exercises with enough effort to increase his heart rate 10 to 19 minutes per day.  He exercises with enough effort to increase his heart rate 3 or less days per week.   He is taking medications regularly.     Here for follow up on chronic neck and back pain.  Patient has longstanding difficulties with chronic neck, thoracic, lumbar back pain over the past couple years.  [facility] has [facility] completed but no additional imaging.  Has tried physical therapy, chiropractic care, medication management with continued progressive symptoms.  Most recently [facility] and [facility] where his images were updated but  "unable to review these records.  Given his chronic persistent pain as well as pain, numbness and tingling rating down his bilateral upper extremities patient is requesting advanced imaging.  He continues to manage with muscle relaxer, as needed chiropractic care.  No new injury to the area.    PAST MEDICAL HISTORY:   Past Medical History:   Diagnosis Date     Personal history of other medical treatment (CODE)     No Comments Provided       PAST SURGICAL HISTORY:   Past Surgical History:   Procedure Laterality Date     OTHER SURGICAL HISTORY      [id number],NO PAST SURGERIES       FAMILY HISTORY: No family history on file.    SOCIAL HISTORY:   Social History     Tobacco Use     Smoking status: Former     Types: Cigars, Other     Start date: 11/1/2015     Smokeless tobacco: Former     Types: Chew     Tobacco comments:     Quit smoking: vape   Vaping Use     Vaping status: Former   Substance Use Topics     Alcohol use: Yes     Alcohol/week: 12.0 standard drinks of alcohol     Comment: 12 a month       No Known Allergies  Current Outpatient Medications   Medication     cyclobenzaprine (FLEXERIL) 10 MG tablet     gabapentin (NEURONTIN) 100 MG capsule     PARoxetine (PAXIL) 30 MG tablet     No current facility-administered medications for this visit.         Review of Systems   Per HPI        Objective    /76   Pulse 90   Temp 97.3  F (36.3  C)   Resp 14   Ht 1.727 m (5' 8\")   Wt 96 kg (211 lb 9.6 oz)   SpO2 98%   BMI 32.17 kg/m    Body mass index is 32.17 kg/m .  Physical Exam   General: Pleasant, in no apparent distress.  Musculoskeletal: Chronic, not repeated  Psych: Appropriate mood and affect.        "

## 2023-04-27 ENCOUNTER — OFFICE VISIT (OUTPATIENT)
Dept: FAMILY MEDICINE | Facility: OTHER | Age: 33
End: 2023-04-27
Attending: PHYSICIAN ASSISTANT
Payer: COMMERCIAL

## 2023-04-27 VITALS
TEMPERATURE: 97.3 F | HEIGHT: 68 IN | RESPIRATION RATE: 14 BRPM | WEIGHT: 211.6 LBS | SYSTOLIC BLOOD PRESSURE: 124 MMHG | DIASTOLIC BLOOD PRESSURE: 76 MMHG | OXYGEN SATURATION: 98 % | HEART RATE: 90 BPM | BODY MASS INDEX: 32.07 KG/M2

## 2023-04-27 DIAGNOSIS — M54.12 CERVICAL RADICULOPATHY: ICD-10-CM

## 2023-04-27 DIAGNOSIS — G89.29 CHRONIC BILATERAL THORACIC BACK PAIN: Primary | ICD-10-CM

## 2023-04-27 DIAGNOSIS — G89.29 CHRONIC NECK PAIN: ICD-10-CM

## 2023-04-27 DIAGNOSIS — M54.2 CHRONIC NECK PAIN: ICD-10-CM

## 2023-04-27 DIAGNOSIS — M54.6 CHRONIC BILATERAL THORACIC BACK PAIN: Primary | ICD-10-CM

## 2023-04-27 PROCEDURE — 99214 OFFICE O/P EST MOD 30 MIN: CPT | Performed by: PHYSICIAN ASSISTANT

## 2023-04-27 PROCEDURE — G0463 HOSPITAL OUTPT CLINIC VISIT: HCPCS

## 2023-04-27 ASSESSMENT — PAIN SCALES - GENERAL: PAINLEVEL: MODERATE PAIN (5)

## 2023-04-27 NOTE — NURSING NOTE
Patient presents to clinic with mid back pain. He has had this since being discharged from the .  Roxane Leon LPN ....................  4/27/2023   3:10 PM

## 2023-04-28 PROBLEM — F98.8 ATTENTION DEFICIT DISORDER (ADD) WITHOUT HYPERACTIVITY: Status: ACTIVE | Noted: 2021-03-19

## 2023-04-28 PROBLEM — M47.816 SPONDYLOSIS OF LUMBAR REGION WITHOUT MYELOPATHY OR RADICULOPATHY: Status: ACTIVE | Noted: 2021-03-19

## 2023-05-11 ENCOUNTER — HOSPITAL ENCOUNTER (OUTPATIENT)
Dept: MRI IMAGING | Facility: OTHER | Age: 33
Discharge: HOME | End: 2023-05-11
Attending: PHYSICIAN ASSISTANT
Payer: COMMERCIAL

## 2023-05-11 DIAGNOSIS — M54.6 CHRONIC BILATERAL THORACIC BACK PAIN: ICD-10-CM

## 2023-05-11 DIAGNOSIS — G89.29 CHRONIC BILATERAL THORACIC BACK PAIN: ICD-10-CM

## 2023-05-11 DIAGNOSIS — M54.2 CHRONIC NECK PAIN: ICD-10-CM

## 2023-05-11 DIAGNOSIS — M54.12 CERVICAL RADICULOPATHY: ICD-10-CM

## 2023-05-11 DIAGNOSIS — G89.29 CHRONIC NECK PAIN: ICD-10-CM

## 2023-05-11 PROCEDURE — 72146 MRI CHEST SPINE W/O DYE: CPT

## 2023-05-11 PROCEDURE — 72141 MRI NECK SPINE W/O DYE: CPT

## 2023-07-20 NOTE — PROGRESS NOTES
Assessment & Plan     Chronic bilateral thoracic back pain  Chronic neck pain  Cervical radiculopathy  Chronic, not well controlled. Recent cervical and thoracic MRI largely unrevealing.  Discussed management options again including chiropractic care, physical therapy, trying alternative muscle relaxer, pain follow-up.  Patient in agreement with both chiropractic and physical therapy as he has had good improvement with this previously.  Recommend trying tizanidine in place of cyclobenzaprine due to minimal improvement.  Follow-up as needed.  - tiZANidine (ZANAFLEX) 2 MG tablet; Take 1-2 tablets (2-4 mg) by mouth 3 times daily as needed for muscle spasms  - Physical Therapy Referral; Future  - Chiropractic Referral; Future        No follow-ups on file.    Guadalupe Charles PA-C  [facility] is a 33 year old, presenting for the following health issues:  Follow Up      History of Present Illness       Back Pain:  He presents for follow up of back pain. Patient's back pain is a chronic problem.  Location of back pain:  Right lower back, left lower back, right middle of back, right upper back, left upper back, right side of neck, right shoulder, left shoulder, right buttock, right hip and right side of waist  Description of back pain: burning, dull ache, gnawing, sharp and stabbing  Back pain spreads: right buttocks, left buttocks, right thigh, left thigh, right foot, left foot, right shoulder, left shoulder, right side of neck and left side of neck    Since patient first noticed back pain, pain is: gradually worsening  Does back pain interfere with his job:  Yes       He eats 0-1 servings of fruits and vegetables daily.He consumes 1 sweetened beverage(s) daily.He exercises with enough effort to increase his heart rate 9 or less minutes per day.  He exercises with enough effort to increase his heart rate 3 or less days per week.   He is taking medications regularly.     Here  "for follow-up on chronic bilateral thoracic back pain as well as neck pain and cervical radiculopathy.  Pain has been chronic and has been poorly controlled more recently.  He has previously managed with PT and chiropractic care with some improvement in the past.  More recently had an MRI of his cervical and thoracic spine completed in May which was largely unrevealing. Continues on Flexeril and gabapentin for management. Minimal change noticed with Flexeril.     PAST MEDICAL HISTORY:   Past Medical History:   Diagnosis Date    Personal history of other medical treatment (CODE)     No Comments Provided       PAST SURGICAL HISTORY:   Past Surgical History:   Procedure Laterality Date    OTHER SURGICAL HISTORY      [id number],NO PAST SURGERIES       FAMILY HISTORY: No family history on file.    SOCIAL HISTORY:   Social History     Tobacco Use    Smoking status: Former     Types: Cigars, Other     Start date: 11/1/2015    Smokeless tobacco: Former     Types: Chew    Tobacco comments:     Quit smoking: vape   Substance Use Topics    Alcohol use: Yes     Alcohol/week: 12.0 standard drinks of alcohol     Comment: 12 a month       No Known Allergies  Current Outpatient Medications   Medication    cyclobenzaprine (FLEXERIL) 10 MG tablet    gabapentin (NEURONTIN) 100 MG capsule    PARoxetine (PAXIL) 30 MG tablet    tiZANidine (ZANAFLEX) 2 MG tablet     No current facility-administered medications for this visit.         Review of Systems   Per HPI        Objective    /74   Pulse 112   Temp 96.8  F (36  C)   Resp 12   Ht 1.727 m (5' 8\")   Wt 96.6 kg (213 lb)   SpO2 96%   BMI 32.39 kg/m    Body mass index is 32.39 kg/m .  Physical Exam   General: Pleasant, in no apparent distress.  Musculoskeletal: Visibly uncomfortable throughout visit, shifting, massaging, stretching neck, upper back, and shoulder regions.   Psych: Appropriate mood and affect.          "

## 2023-07-25 ENCOUNTER — OFFICE VISIT (OUTPATIENT)
Dept: FAMILY MEDICINE | Facility: OTHER | Age: 33
End: 2023-07-25
Attending: PHYSICIAN ASSISTANT
Payer: COMMERCIAL

## 2023-07-25 VITALS
HEIGHT: 68 IN | RESPIRATION RATE: 12 BRPM | OXYGEN SATURATION: 96 % | HEART RATE: 112 BPM | TEMPERATURE: 96.8 F | BODY MASS INDEX: 32.28 KG/M2 | SYSTOLIC BLOOD PRESSURE: 120 MMHG | WEIGHT: 213 LBS | DIASTOLIC BLOOD PRESSURE: 74 MMHG

## 2023-07-25 DIAGNOSIS — G89.29 CHRONIC NECK PAIN: ICD-10-CM

## 2023-07-25 DIAGNOSIS — M54.6 CHRONIC BILATERAL THORACIC BACK PAIN: Primary | ICD-10-CM

## 2023-07-25 DIAGNOSIS — G89.29 CHRONIC BILATERAL THORACIC BACK PAIN: Primary | ICD-10-CM

## 2023-07-25 DIAGNOSIS — M54.12 CERVICAL RADICULOPATHY: ICD-10-CM

## 2023-07-25 DIAGNOSIS — M54.2 CHRONIC NECK PAIN: ICD-10-CM

## 2023-07-25 PROCEDURE — 99214 OFFICE O/P EST MOD 30 MIN: CPT | Performed by: PHYSICIAN ASSISTANT

## 2023-07-25 PROCEDURE — G0463 HOSPITAL OUTPT CLINIC VISIT: HCPCS

## 2023-07-25 RX ORDER — TIZANIDINE 2 MG/1
2-4 TABLET ORAL 3 TIMES DAILY PRN
Qty: 60 TABLET | Refills: 1 | Status: SHIPPED | OUTPATIENT
Start: 2023-07-25

## 2023-07-25 ASSESSMENT — PATIENT HEALTH QUESTIONNAIRE - PHQ9
SUM OF ALL RESPONSES TO PHQ QUESTIONS 1-9: 10
SUM OF ALL RESPONSES TO PHQ QUESTIONS 1-9: 10
10. IF YOU CHECKED OFF ANY PROBLEMS, HOW DIFFICULT HAVE THESE PROBLEMS MADE IT FOR YOU TO DO YOUR WORK, TAKE CARE OF THINGS AT HOME, OR GET ALONG WITH OTHER PEOPLE: SOMEWHAT DIFFICULT

## 2023-07-25 ASSESSMENT — PAIN SCALES - GENERAL: PAINLEVEL: SEVERE PAIN (6)

## 2023-08-02 ENCOUNTER — THERAPY VISIT (OUTPATIENT)
Dept: CHIROPRACTIC MEDICINE | Facility: OTHER | Age: 33
End: 2023-08-02
Attending: PHYSICIAN ASSISTANT
Payer: COMMERCIAL

## 2023-08-02 VITALS
DIASTOLIC BLOOD PRESSURE: 86 MMHG | OXYGEN SATURATION: 98 % | SYSTOLIC BLOOD PRESSURE: 112 MMHG | HEART RATE: 97 BPM | TEMPERATURE: 97 F | RESPIRATION RATE: 16 BRPM

## 2023-08-02 DIAGNOSIS — M54.6 CHRONIC BILATERAL THORACIC BACK PAIN: Primary | ICD-10-CM

## 2023-08-02 DIAGNOSIS — M54.2 CHRONIC NECK PAIN: ICD-10-CM

## 2023-08-02 DIAGNOSIS — M54.42 CHRONIC BILATERAL LOW BACK PAIN WITH BILATERAL SCIATICA: ICD-10-CM

## 2023-08-02 DIAGNOSIS — M54.12 CERVICAL RADICULOPATHY: ICD-10-CM

## 2023-08-02 DIAGNOSIS — M54.41 CHRONIC BILATERAL LOW BACK PAIN WITH BILATERAL SCIATICA: ICD-10-CM

## 2023-08-02 DIAGNOSIS — G89.29 CHRONIC BILATERAL THORACIC BACK PAIN: Primary | ICD-10-CM

## 2023-08-02 DIAGNOSIS — M99.01 SEGMENTAL AND SOMATIC DYSFUNCTION OF CERVICAL REGION: ICD-10-CM

## 2023-08-02 DIAGNOSIS — M99.02 SEGMENTAL AND SOMATIC DYSFUNCTION OF THORACIC REGION: ICD-10-CM

## 2023-08-02 DIAGNOSIS — G89.29 CHRONIC NECK PAIN: ICD-10-CM

## 2023-08-02 DIAGNOSIS — M99.04 SEGMENTAL AND SOMATIC DYSFUNCTION OF SACRAL REGION: ICD-10-CM

## 2023-08-02 DIAGNOSIS — G89.29 CHRONIC BILATERAL LOW BACK PAIN WITH BILATERAL SCIATICA: ICD-10-CM

## 2023-08-02 PROCEDURE — G0463 HOSPITAL OUTPT CLINIC VISIT: HCPCS

## 2023-08-02 PROCEDURE — 99202 OFFICE O/P NEW SF 15 MIN: CPT | Mod: 25 | Performed by: CHIROPRACTOR

## 2023-08-02 PROCEDURE — 98941 CHIROPRACT MANJ 3-4 REGIONS: CPT | Mod: AT | Performed by: CHIROPRACTOR

## 2023-08-02 NOTE — PROGRESS NOTES
Started about 2016 bilateral upper back with constant burning and dull discomfort. Radiating to the chest and down both arms. Using muscle relaxer's and a Theracane with a small decrease.   Neck with frequent sharp stabbing. 6/10 W24 9/10. Using muscle relaxer's with a small decrease.   Bilateral lower back with constant dull aching. 4/10 W24 6/10. Using muscle relaxer's and a Theracane with a small decrease.   Carol Cerna  on 8/2/2023 at 10:07 AM    Reviewed by CC.    PATIENT:  Demarco Epps is a 33 year old male presenting for chronic neck, mid back and lower back pain.  Patient also reports a radicular component in his neck and lower back at times.  Patient reports that his pain started approximately 2016 and has been a constant burning and dull discomfort.  At times pain will radiate around his chest.  Patient frequently uses muscle relaxers when it feels very tight and will use over-the-counter painkillers to somewhat relieve symptoms.  He denies a specific onset of symptoms and states that it has been more gradually worsening over time.  He reports that over the last few years the problem has been more poorly managed.  He reports that the majority of his chronic pain started when he was in the  from training and active duty.  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    Neck pain is described as a constant frequent sharp or stabbing pain that can be anywhere from 6-9/10.  Occasionally he will experience tension type headaches as well that he states border on the line of migraine.  He is not currently experiencing a headache but states over the weekend he did have a bad one.  Patient reports that the majority of the pain is felt in the shoulders bilaterally continuing into the upper back.  He will have radicular symptoms into the arms however recent MRI studies of the cervical and thoracic spine were relatively unremarkable, revealing minimal disc bulging at C6-C7.  ADLs most affected  are any activity with his arms such as splitting wood, cutting or lifting.  Patient denies any nausea, vertigo, headaches, visual disturbances.    Lower back pain is described as a constant dull ache rating the pain 4-6/10.  Muscle relaxers and foam rollers are used as management tools at home.  He also reports occasional pain into his buttock, thighs and to his right midfoot.  Patient denies any changes in bowel or bladder function, saddle paresthesia.    Patient has been referred for physical therapy and chiropractic care from his primary care provider, RUMA Contreras. Previous physical therapy and chiropractic care has been utilized in the past with favorable results and pain management.  Patient reports that he is used to a manual style of adjusting throughout the spine and has responded very favorably to that in the past other than occasional posttreatment soreness.    PROBLEM:   Date of Initial Visit for this Episode:  8/2/2023    @TDR@ Visit #1/12    (DVPRS) Pain Rating Score : 4-Distracts me, can do usual activities (W24 6/10) (08/02/23 1003)    See flowsheets in chart for details.  8/2/2023 8/2/2023    10:10 AM   Neck Disability Index (  Antolin H. and Madelyn C. 1991. All rights reserved.; used with permission)   SECTION 1 - PAIN INTENSITY 3   SECTION 2 - PERSONAL CARE 1   SECTION 3 - LIFTING 0   SECTION 4 - READING 3   SECTION 5 - HEADACHES 4   SECTION 6 - CONCENTRATION 2   SECTION 7 - WORK 3   SECTION 8 - DRIVING 3   SECTION 9 - SLEEPING 1   SECTION 10 - RECREATION 2   Count 10   Sum 22   Raw Score: /50 22   Neck Disability Index Score: (%) 44 %      Oswestry (JING) Questionnaire        8/2/2023    10:07 AM   OSWESTRY DISABILITY INDEX   Count 9   Sum 16   Oswestry Score (%) 35.56 %       Exercise habits: Patient limits activity when pain intensity increases  Sleeping habits: When comfortable patient is able to sleep well and pain does not wake him up.  He prefers to sleep on his side or  back.    Past D.C. Care: Previous chiropractic care locally with favorable results.         PAST MEDICAL HISTORY:  Past Medical History:   Diagnosis Date    Personal history of other medical treatment (CODE)     No Comments Provided       PAST SURGICAL HISTORY:  Past Surgical History:   Procedure Laterality Date    OTHER SURGICAL HISTORY      [id number],NO PAST SURGERIES       ALLERGIES:  No Known Allergies    CURRENT MEDICATIONS:  Current Outpatient Medications   Medication Sig Dispense Refill    cyclobenzaprine (FLEXERIL) 10 MG tablet Take 10 mg by mouth daily      gabapentin (NEURONTIN) 100 MG capsule Take 100 mg by mouth 2 times daily      PARoxetine (PAXIL) 30 MG tablet Take 30 mg by mouth At Bedtime      tiZANidine (ZANAFLEX) 2 MG tablet Take 1-2 tablets (2-4 mg) by mouth 3 times daily as needed for muscle spasms 60 tablet 1       SOCIAL HISTORY:  Social History     Socioeconomic History    Marital status:    Tobacco Use    Smoking status: Former     Types: Cigars, Other     Start date: 11/1/2015    Smokeless tobacco: Former     Types: Chew    Tobacco comments:     Quit smoking: vape   Vaping Use    Vaping Use: Former   Substance and Sexual Activity    Alcohol use: Yes     Alcohol/week: 12.0 standard drinks of alcohol     Comment: 12 a month     Drug use: No     Comment: Drug use: Nonot currently    Sexual activity: Yes     Partners: Female   Social History Narrative    .  Currently unemployed.        FAMILY HISTORY:  No family history on file.    Patient Active Problem List   Diagnosis    Depression, unspecified depression type    Tinnitus, bilateral    Persistent insomnia    Attention deficit disorder (ADD) without hyperactivity    Spondylosis of lumbar region without myelopathy or radiculopathy    Chronic neck pain         ROS:  The patient denies any fevers, chills, nausea, vomiting, diarrhea, constipation,dysuria, hematuria, or urinary hesitancy or incontinence.  No shortness of breath, chest  pain, or rashes.    OBJECTIVE:    DIAGNOSTICS:   Cervical and thoracic MRI dated May 11, 2023:   MR CERVICAL SPINE W/O CONTRAST, MR THORACIC SPINE W/O CONTRAST     HISTORY: Chronic neck pain; Chronic neck pain; Cervical radiculopathy     TECHNIQUE: Sagittal T1, T2 and STIR as well as axial T2 gradient and  3-D T2 images of the cervical and thoracic spines were obtained.     COMPARISON: None.     FINDINGS:       The cervical lordosis is preserved. The thoracic kyphosis is  maintained. The vertebral body heights are maintained. No suspicious  marrow lesion is identified. The craniocervical junction is intact.     The cord has a normal caliber. There are no areas of abnormal cord  signal. No masses or fluid collections are seen in the spinal canal or  paravertebral soft tissues.       There is a minimal disc bulge at C6-7. The spinal canal and neural  foramina remain diffusely patent.                                                                      IMPRESSION:     Essentially normal MR appearance of the cervical and thoracic spines.     CAYETANO MOYA MD       Lumbar x-rays dated November 7, 2022 from outside source:    5 lumbar-type vertebral bodies.  Incidental incomplete fusion of the posterior elements of L5.  Sacroiliac joints are within normal.  Straightening of the normal lumbar lordosis.  There is no spondylolisthesis.  Vertebral heights are well-preserved.  Lumbar discs are preserved.  Minimal anterior superior endplate bony hypertrophy of the L4 and L5 vertebral bodies.  No significant degenerative changes of the facet joints.  Probable artificial lucency through the L5 pars interarticularis on the L5-S1 spot view.  This is not seen on the lateral view of the spine.  Dr. Alfreda Jiang MD, radiologist        PHYSICAL EXAM:   /86 (BP Location: Right arm, Patient Position: Sitting, Cuff Size: Adult Regular)   Pulse 97   Temp 97  F (36.1  C) (Tympanic)   Resp 16   SpO2 98%    GENERAL  APPEARANCE: healthy, alert, and no distress   GAIT: NORMAL  SKIN: no suspicious lesions or rashes  NEURO: cranial nerves 2-12 intact, normal gait, symmetric and equal DTRs, normal strength and light touch sensory  PSYCH:  mentation appears normal and affect normal/bright    MUSCULOSKELETAL:   Posture: Anterior head carriage, rounded shoulders.  Loss of lumbar lordosis  Gait:  unremarkable.     Cervical  Active cervical range of motion is significantly limited in right and left lateral flexion, bilateral rotation.  Pain and tightness is felt on endrange flexion extension and lateral flexion bilaterally.    -Maximal Foraminal Compression: Negative for increasing cervical or radicular pain   Shoulder Depression: Positive for tightness and discomfort in the shoulders  -Distraction improves cervical and shoulder symptoms      Thoracic and Lumbar  Thoracolumbar range of motion is significantly limited in flexion as well as about 10 degrees of extension.  Patient reports feeling very tight and restricted.    +Kemps:  T10-T12, T4, L4-S1 bilaterally  Slump test positive for muscular and dural tension into the thigh bilaterally, negative for radicular pain  Yeomans positive for lumbosacral pain when tested bilaterally  Nachlas: Negative bilaterally  Spring test positive for pain bilaterally SI joints, T4    +Tenderness: Tenderness to palpation bilaterally in the levator scapula, teres groups, latissimus dorsi, thoracic paravertebral, lumbar paravertebral, QL, gluten insertion, suboccipital and paravertebral bilaterally.  Tenderness to palpation at T4, T10-T12, bilateral SI joint, C2-C6 on the left, C2 on the right.  +Muscle spasm: Bilateral latissimus dorsi and teres group  +Joint asymmetry and restriction: T2 left rib, T4 extension restriction, T10-T12 extension restriction, C2 left lateral flexion and extension restriction, right SI joint    ASSESSMENT: Demarco Epps is a 33 year old male who is suffering with chronic  "neck, thoracic and lumbar pain.  Pain at times can be radicular in nature in the cervical and lumbar spine however imaging studies are relatively unremarkable.  Patient has responded favorably with chiropractic care in the past and currently there are no contraindications on exam to a trial of care of manual manipulative therapy.  Due to the chronic nature of his condition a longer treatment plan may be required to achieve desired results.  Patient is also treating with physical therapy starting next week and should respond favorably.    1. Chronic bilateral thoracic back pain    2. Chronic neck pain    3. Cervical radiculopathy    4. Chronic bilateral low back pain with bilateral sciatica    5. Segmental and somatic dysfunction of cervical region    6. Segmental and somatic dysfunction of thoracic region    7. Segmental and somatic dysfunction of sacral region        PLAN    Evaluation and Management:  58374 Moderate exam established patient 20 min    Procedures:  Modalities:  65847: MSTM:  To Levator scapulae, Quad lumb, Sub-occipital, T-spine paraspinal, Traps, and teres group, latissimus dorsi bilaterally:   for 15 minutes min    CMT:  66062 Chiropractic manipulative treatment 3-4 regions performed   Cervical: Diversified, C2, Supine  Thoracic: Diversified, T4, T10, T11, T12 thoracic Butler, Prone  Pelvis: Diversified, PSIS Right , Side posture    Therapeutic procedures:  None    Response to Treatment  Reduction in symptoms as reported by patient.  Patient reported an immediate improvement in pain intensity as well as a feeling of \"looseness\".  Patient has expressed in the past that with spinal manipulation he has experienced soreness the day following.  Patient has been advised that this is a possible outcome, although this was taken into account during treatment.    Prognosis: Guarded, I expect favorable results however due to the nature of his chronic pain, his improvements may ebb and flow.    8/2/2023 Plan " of Care:  8-12 visits of Chiropractic Care including Spinal Adjustments and/or physiotherapy and active rehabilitation, to include exercises in the office and/or at home to meet care plan goals.     Frequency: 2xweek for up to 4-6 weeks. A reevaluation would be clinically appropriate in 6-8 visits, to determine progress and further course of care.    POC discussed and patient agreeable to plan of care.      8/2/2023 Goals:      Goals of treatment include decreasing patient's pain intensity and frequency 25% in the first 4 weeks, improving patient's Oswestry and NDI score 30% in 4 weeks.    INSTRUCTIONS   ice 20 minutes every other hour as needed and heat 15 minutes every other hour as needed    Follow-up:  Return to care early next week

## 2023-08-07 ENCOUNTER — THERAPY VISIT (OUTPATIENT)
Dept: CHIROPRACTIC MEDICINE | Facility: OTHER | Age: 33
End: 2023-08-07
Attending: CHIROPRACTOR
Payer: COMMERCIAL

## 2023-08-07 VITALS — RESPIRATION RATE: 16 BRPM | OXYGEN SATURATION: 98 % | TEMPERATURE: 97.9 F | HEART RATE: 78 BPM

## 2023-08-07 DIAGNOSIS — M54.42 CHRONIC BILATERAL LOW BACK PAIN WITH BILATERAL SCIATICA: ICD-10-CM

## 2023-08-07 DIAGNOSIS — M99.04 SEGMENTAL AND SOMATIC DYSFUNCTION OF SACRAL REGION: ICD-10-CM

## 2023-08-07 DIAGNOSIS — M99.02 SEGMENTAL AND SOMATIC DYSFUNCTION OF THORACIC REGION: ICD-10-CM

## 2023-08-07 DIAGNOSIS — M54.41 CHRONIC BILATERAL LOW BACK PAIN WITH BILATERAL SCIATICA: ICD-10-CM

## 2023-08-07 DIAGNOSIS — M54.12 CERVICAL RADICULOPATHY: ICD-10-CM

## 2023-08-07 DIAGNOSIS — G89.29 CHRONIC BILATERAL LOW BACK PAIN WITH BILATERAL SCIATICA: ICD-10-CM

## 2023-08-07 DIAGNOSIS — M54.6 CHRONIC BILATERAL THORACIC BACK PAIN: Primary | ICD-10-CM

## 2023-08-07 DIAGNOSIS — G89.29 CHRONIC BILATERAL THORACIC BACK PAIN: Primary | ICD-10-CM

## 2023-08-07 DIAGNOSIS — M54.2 CHRONIC NECK PAIN: ICD-10-CM

## 2023-08-07 DIAGNOSIS — M99.01 SEGMENTAL AND SOMATIC DYSFUNCTION OF CERVICAL REGION: ICD-10-CM

## 2023-08-07 DIAGNOSIS — G89.29 CHRONIC NECK PAIN: ICD-10-CM

## 2023-08-07 PROCEDURE — 98941 CHIROPRACT MANJ 3-4 REGIONS: CPT | Mod: AT | Performed by: CHIROPRACTOR

## 2023-08-07 PROCEDURE — G0463 HOSPITAL OUTPT CLINIC VISIT: HCPCS

## 2023-08-07 NOTE — PROGRESS NOTES
Bilateral upper back with frequent dull aching and sharp more in the mid back. Radiating down both arms to the thumb and index fingers worse on the right.  5/10 W24 10/10. Using theracane, foam roller and muscle relaxer's with a small decrease.   Bilateral lower back with  constant aching and tightness. 4/10 W24 7/10. foam roller, muscle relaxer's, and ibuprofen with a small decrease.    Neck with constant tightness and pulling. 5/10 W24 8/10.  Using muscle relaxer's and tennis ball with a  small decrease.   Carol Cerna on 8/7/2023 at 11:15 AM    Reviewed by CC.    Visit #:  2/12    Subjective:  Demarco Epps is a 33 year old male who is seen in f/u up for:         Chronic bilateral thoracic back pain  Chronic neck pain  Cervical radiculopathy  Chronic bilateral low back pain with bilateral sciatica  Segmental and somatic dysfunction of cervical region  Segmental and somatic dysfunction of thoracic region  Segmental and somatic dysfunction of sacral region.     Since last visit on 8/2/2023,  Demarco Epps reports: That he had a reduction in symptoms since last visit.  He continues to work his upper back with a Thera cane for relief at home.  Patient reports that he had no significant headache since last visit, however over the weekend he felt like he may start to get one.  Patient reports that he was unsure how to rate his pain indices and states that has improved range of motion and tension symptoms are better than the pain scores depict.  Patient is scheduled to begin physical therapy later this week Thursday, August 10.    (DVPRS) Pain Rating Score : 5-Interrupts some activities (W24 8/10) (08/07/23 1111)     Objective:  The following was observed:  Pulse 78   Temp 97.9  F (36.6  C) (Tympanic)   Resp 16   SpO2 98%      P: palpatory tenderness upper trapezius, levator Scap, latissimus dorsi, teres minor and major, suboccipital, thoracic paravertebrals Bilaterally  A: static palpation demonstrates  intersegmental asymmetry   R: motion palpation notes restricted motion  T: hypertonicity at: Levator scapulae, Traps, and latissimus dorsi:  Bilaterally    Segmental spinal dysfunction/restrictions found at:  :  C2 Left rotation restricted and Extension restriction  T4 Left rotation restricted and Extension restriction  T10 Extension restriction  PSIS Right Extension restriction.      Assessment:    Diagnoses:      1. Chronic bilateral thoracic back pain    2. Chronic neck pain    3. Cervical radiculopathy    4. Chronic bilateral low back pain with bilateral sciatica    5. Segmental and somatic dysfunction of cervical region    6. Segmental and somatic dysfunction of thoracic region    7. Segmental and somatic dysfunction of sacral region        Patient's condition:  Patient had restrictions pre-manipulation    Treatment effectiveness:  Post manipulation there is better intersegmental movement and Patient claims to feel looser post manipulation      Procedures:  CMT:  19007 Chiropractic manipulative treatment 3-4 regions performed   Cervical: Diversified, C2, Supine  Thoracic: Diversified, T4, T10, Prone  Pelvis: Diversified, PSIS Right , Side posture    Modalities:  Manual therapy, pin and stretch bilateral latissimus dorsi, teres group, suboccipital.    Therapeutic procedures:  None    Response to Treatment  Reduction in symptoms as reported by patient    Prognosis: Good    Progress towards Goals: Patient is making progress towards the goal.     Recommendations:    Instructions:ice 20 minutes every other hour as needed and heat 15 minutes every other hour as needed    Follow-up:  Return to care in 1 week.   Patient also asked about the possibility of battlefield acupuncture.  I informed the patient that I will speak with our acupuncture provider to set up possible eval and treatment.

## 2023-08-10 ENCOUNTER — THERAPY VISIT (OUTPATIENT)
Dept: PHYSICAL THERAPY | Facility: OTHER | Age: 33
End: 2023-08-10
Attending: PHYSICIAN ASSISTANT
Payer: COMMERCIAL

## 2023-08-10 DIAGNOSIS — M54.6 CHRONIC BILATERAL THORACIC BACK PAIN: ICD-10-CM

## 2023-08-10 DIAGNOSIS — G89.29 CHRONIC BILATERAL THORACIC BACK PAIN: ICD-10-CM

## 2023-08-10 DIAGNOSIS — M54.12 CERVICAL RADICULOPATHY: ICD-10-CM

## 2023-08-10 DIAGNOSIS — M54.2 CHRONIC NECK PAIN: ICD-10-CM

## 2023-08-10 DIAGNOSIS — G89.29 CHRONIC NECK PAIN: ICD-10-CM

## 2023-08-10 PROCEDURE — 97161 PT EVAL LOW COMPLEX 20 MIN: CPT | Mod: GP

## 2023-08-10 PROCEDURE — 97110 THERAPEUTIC EXERCISES: CPT | Mod: GP

## 2023-08-10 NOTE — PROGRESS NOTES
PHYSICAL THERAPY EVALUATION  Type of Visit: Evaluation    See electronic medical record for Abuse and Falls Screening details.    Subjective       Presenting condition or subjective complaint:   Patient reports he had had chronic back pain for ~8 years. Reports he has been to PT/chiro in the past and currently working with chiro now and noticing improved mobility. Patient reports he has R sided mid back/side body and low back pain currently that will also radiate up into the R shoulder. Reports pain is 5/10 at rest and fluctuates frequently. Patient reports he has had to take ibuprofen daily at times and recently prescribed flexeril that he believes is helping. Pain is worsened with moving, working (using a saw, having UEs extended, driving, etc.); pain improved with heat, muscle relaxer. Endorses numbness/tingling in R thumb/index finger (again will fluctuate). Patient is R handed -- will report pain/fatigue with carrying kids (will have to readjust every 2 minutes or so). States R side will fatigue before the L and his tolerance for doing tasks or time it takes him to complete a task is much longer than it use to be. Reports tosses/turns during night; reports pain will wake up in the night and will keep from getting to sleep.    Date of onset:      Relevant medical history:     Dates & types of surgery:   none    Prior diagnostic imaging/testing results:     MR CERVICAL SPINE W/O CONTRAST, MR THORACIC SPINE W/O CONTRAST     HISTORY: Chronic neck pain; Chronic neck pain; Cervical radiculopathy     TECHNIQUE: Sagittal T1, T2 and STIR as well as axial T2 gradient and  3-D T2 images of the cervical and thoracic spines were obtained.     COMPARISON: None.     FINDINGS:       The cervical lordosis is preserved. The thoracic kyphosis is  maintained. The vertebral body heights are maintained. No suspicious  marrow lesion is identified. The craniocervical junction is intact.     The cord has a normal caliber. There are no  "areas of abnormal cord  signal. No masses or fluid collections are seen in the spinal canal or  paravertebral soft tissues.       There is a minimal disc bulge at C6-7. The spinal canal and neural  foramina remain diffusely patent.                                                                      IMPRESSION:     Essentially normal MR appearance of the cervical and thoracic spines.  Prior therapy history for the same diagnosis, illness or injury:     yes - saw therapy ~1 year ago but was unable to make many appointments due to work    Prior Level of Function  Transfers: Independent  Ambulation: Independent  ADL: Independent  IADL:     Living Environment  Social support:   5 kids, spouse  Type of home:   2-story  Stairs to enter the home:         Ramp:     Stairs inside the home:       flight inside -- reports sometimes will have difficulty with  Help at home:    Equipment owned:       Employment:     unemployed  Hobbies/Interests:      Patient goals for therapy:      Pain assessment: Pain present  See objective evaluation for additional pain details     Objective   LUMBAR SPINE EVALUATION  POSTURE:  forward shoulder posture  GAIT:   Weightbearing Status: WBAT  Assistive Device(s): None  Gait Deviations: WNL  BALANCE/PROPRIOCEPTION:  n/a  ROM:  WNL; extension from thoracolumbar junction mainly -- reports no pain but \"stretch\" with rotation B directions  PELVIC/SI SCREEN: WNL  STRENGTH:  R hip flexion 3/5 + pain in R low back; L hip flexion 4/5 + pain for R low back; B hip abduction 5/5; R hip extension 3/5 + pain; L hip extension 5/5; abdominals 3/5 with B LE lowering  NEURAL TENSION:  Reports some low back \"irritation\" with seated Slump test  FLEXIBILITY:  decreased hamstring length B  LUMBAR/HIP Special Tests:    Left Right   YOANDY Negative  + low back pain   FADIR/Labrum/ANNIA Negative  Negative    Femoral Nerve     Reba's     Piriformis     Quadrant Testing     SLR Negative  + low back pain and weak   Slump   "   Galdino with Extension     Sarkis             PELVIS/SI SPECIAL TESTS: WNL  PALPATION:  tenderness noted along paraspinals B lumbar region  SPINAL SEGMENTAL CONCLUSIONS:  WNL; denies discomfort with PAs    CERVICAL SPINE EVALUATION  PAIN: Pain Level at Rest: 5/10  Pain Level with Use: 8/10  Pain Location: cervical spine, thoracic spine, and lumbar spine  Pain Quality: Aching, Burning, Dull, and Stabbing  Pain Frequency: constant  Pain is Worst: daytime or nighttime  Pain is Exacerbated By: lifting, carrying, walking, sitting, bending, ADLs, IADLs  Pain is Relieved By: heat and muscle relaxers, certain position  Pain Progression: Worsened  ROM:  WNL all directions; reports tautness to the R/L rotation on the R side extending down into side body  STRENGTH: R shoulder flexion 3/5; L shoulder flexion 4/5; B shoulder abduction 4/5; B shoulder IR 4/5; B shoulder ER 4/5; elbow flexion 4/5; dynameter  strength on L 2: R (75#) L (95#) -- for norms considered weak on the R and low normal on the L  FLEXIBILITY:  decreased pec major, pec minor, levator scapular length B    PALPATION:  tautness along B upper trap; mid trap and latissimus muscle with tenderness along R lateral edge of scapula  SPINAL SEGMENTAL CONCLUSIONS:  hypomobility in cervical and thoracic spine with PA; reports relief of symptoms with manual cervical traction       Assessment & Plan   CLINICAL IMPRESSIONS  Medical Diagnosis: M54.6, G89.29 (ICD-10-CM) - Chronic bilateral thoracic back pain  M54.2, G89.29 (ICD-10-CM) - Chronic neck pain  M54.12 (ICD-10-CM) - Cervical radiculopathy    Treatment Diagnosis: Chronic Low Back and Neck pain   Impression/Assessment: Patient is a 33 year old male with cerivcal, thoracic and lumbar pain complaints.  The following significant findings have been identified: Pain, Decreased ROM/flexibility, Decreased strength, Impaired balance, Impaired sensation, Impaired gait, Impaired muscle performance, Decreased activity  tolerance, and Impaired posture. These impairments interfere with their ability to perform self care tasks, work tasks, and recreational activities as compared to previous level of function.     Clinical Decision Making (Complexity):  Clinical Presentation: Stable/Uncomplicated  Clinical Presentation Rationale: based on medical and personal factors listed in PT evaluation  Clinical Decision Making (Complexity): Low complexity    PLAN OF CARE  Treatment Interventions:  Modalities: Cryotherapy, E-stim, Hot Pack, Mechanical Traction, Ultrasound  Interventions: Gait Training, Manual Therapy, Neuromuscular Re-education, Therapeutic Activity, Therapeutic Exercise    Long Term Goals     PT Goal 1  Goal Identifier: pain  Goal Description: Patient will report pain of 2/10 with functional tasks of carrying/lifting children, reaching overhead within 6 weeks  Target Date: 09/21/23  PT Goal 2  Goal Identifier: strength  Goal Description: Patient will increase R shoulder and proximal hip musculature strength to 4/5 in all directions within 6 weeks  Target Date: 09/21/23  PT Goal 3  Goal Identifier: HEP  Goal Description: Patient will be independent with HEP and appropriate progressions within 2 weeks  Target Date: 08/24/23      Frequency of Treatment: 2x week  Duration of Treatment: 8 weeks    Recommended Referrals to Other Professionals:   Education Assessment:   Learner/Method: Patient;Listening;Reading;Demonstration;Pictures/Video    Risks and benefits of evaluation/treatment have been explained.   Patient/Family/caregiver agrees with Plan of Care.     Evaluation Time:     PT Eval, Low Complexity Minutes (71891): 30       Signing Clinician: Isabela Corcoran PT      [facility]                                                                                   OUTPATIENT PHYSICAL THERAPY    PLAN OF TREATMENT FOR OUTPATIENT REHABILITATION   Patient's Last Name, First Name, Demarco Stark Date of  Birth:  1990   Provider's Name   [facility]   Medical Record No.  [id number]     Onset Date:    Start of Care Date: 08/10/23     Medical Diagnosis:  M54.6, G89.29 (ICD-10-CM) - Chronic bilateral thoracic back pain  M54.2, G89.29 (ICD-10-CM) - Chronic neck pain  M54.12 (ICD-10-CM) - Cervical radiculopathy      PT Treatment Diagnosis:  Chronic Low Back and Neck pain Plan of Treatment  Frequency/Duration: 2x week/ 8 weeks    Certification date from 08/10/23 to 10/19/23         See note for plan of treatment details and functional goals     Isabela Corcoran, PT                         I CERTIFY THE NEED FOR THESE SERVICES FURNISHED UNDER        THIS PLAN OF TREATMENT AND WHILE UNDER MY CARE     (Physician attestation of this document indicates review and certification of the therapy plan).                Referring Provider:  RUMA Negron      Initial Assessment  See Epic Evaluation- Start of Care Date: 08/10/23

## 2023-08-22 NOTE — PROGRESS NOTES
08/10/23 0500   Appointment Info   Signing clinician's name / credentials Isabela Corcoran, PT   Visits Used 1   Medical Diagnosis M54.6, G89.29 (ICD-10-CM) - Chronic bilateral thoracic back pain  M54.2, G89.29 (ICD-10-CM) - Chronic neck pain  M54.12 (ICD-10-CM) - Cervical radiculopathy   PT Tx Diagnosis Chronic Low Back and Neck pain   Quick Adds Certification   Progress Note/Certification   Start of Care Date 08/10/23   Therapy Frequency 2x week   Predicted Duration 8 weeks   Certification date from 08/10/23   Certification date to 10/19/23   GOALS   PT Goals 2;3   PT Goal 1   Goal Identifier pain   Goal Description Patient will report pain of 2/10 with functional tasks of carrying/lifting children, reaching overhead within 6 weeks   Goal Progress No progress as patient does not return for follow-up treatment   Target Date 09/21/23   PT Goal 2   Goal Identifier strength   Goal Description Patient will increase R shoulder and proximal hip musculature strength to 4/5 in all directions within 6 weeks   Goal Progress No progress as patient does not return for follow-up treatment   Target Date 09/21/23   PT Goal 3   Goal Identifier HEP   Goal Description Patient will be independent with HEP and appropriate progressions within 2 weeks   Goal Progress No progress as patient does not return for follow-up treatment   Target Date 08/24/23   Subjective Report   Subjective Report see initial evaluation   Treatment Interventions (PT)   Interventions Therapeutic Procedure/Exercise   Therapeutic Procedure/Exercise   Therapeutic Procedures: strength, endurance, ROM, flexibillity minutes (65756) 15   Ther Proc 1 lower trunk rotation crossover (with opposite LE crossed over) x3 reps x15 seconds; upper trap stretch x3 reps x15 seconds B   Skilled Intervention cues for stretch vs strain; education on POC/goals of therapy   Patient Response/Progress fair   Eval/Assessments   PT Eval, Low Complexity Minutes (76767) 30   Education    Learner/Method Patient;Listening;Reading;Demonstration;Pictures/Video   Plan   Home program lower trunk crossover rotation; upper trap stretch   Plan for next session STM/manual as tolerated; progress strengthening as tolerated --- patient no show/no clls for subsequent 3 appts and discharged from PT at this time. Will require new orders to resume therapy services.   Total Session Time   Timed Code Treatment Minutes 15   Total Treatment Time (sum of timed and untimed services) 45         DISCHARGE  Reason for Discharge: Patient no calls/no shows 3 subsequent appts., further appts cancelled and patient discharged at this time    Equipment Issued: none    Discharge Plan: none provided as patient does not return for follow-up treatment.    Referring Provider:  Guadalupe Charles. RUPESH Corcoran, PT - signed 8/22/2023

## 2024-06-05 ENCOUNTER — OFFICE VISIT (OUTPATIENT)
Dept: FAMILY MEDICINE | Facility: OTHER | Age: 34
End: 2024-06-05
Attending: NURSE PRACTITIONER
Payer: COMMERCIAL

## 2024-06-05 VITALS
SYSTOLIC BLOOD PRESSURE: 118 MMHG | DIASTOLIC BLOOD PRESSURE: 72 MMHG | WEIGHT: 199.1 LBS | OXYGEN SATURATION: 98 % | BODY MASS INDEX: 30.17 KG/M2 | TEMPERATURE: 98.7 F | HEIGHT: 68 IN | HEART RATE: 92 BPM | RESPIRATION RATE: 17 BRPM
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
## 2024-06-05 DIAGNOSIS — S40.861A TICK BITE OF AXILLARY REGION, RIGHT, INITIAL ENCOUNTER: Primary | ICD-10-CM

## 2024-06-05 PROCEDURE — G0463 HOSPITAL OUTPT CLINIC VISIT: HCPCS

## 2024-06-05 PROCEDURE — 99213 OFFICE O/P EST LOW 20 MIN: CPT | Performed by: NURSE PRACTITIONER

## 2024-06-05 RX ORDER — DOXYCYCLINE HYCLATE 100 MG
200 TABLET ORAL ONCE
Qty: 2 TABLET | Refills: 0 | Status: SHIPPED | OUTPATIENT
Start: 2024-06-05 | End: 2024-06-05

## 2024-06-05 ASSESSMENT — PAIN SCALES - GENERAL: PAINLEVEL: NO PAIN (0)

## 2024-06-05 NOTE — PATIENT INSTRUCTIONS
Anaplasma symptoms include:  sudden onset fever, chills, body aches, headaches, fatigue, nausea/vomiting, etc  Symptoms usually occur within 5 to 10 days of tick bite.  Follow up immediately if symptoms develop.    Lyme symptoms include:  general aches, headaches, fatigue, vision change, joint aches, joint swelling, fevers, rash (bulls eye lesion can be at site of tick bite, anywhere on the body or multiple lesions), etc   Symptoms usually occur within 2 weeks to 8 weeks.    Prophylactic antibiotic dosing includes Doxycycline if the following criteria is met:  One dose of Doxycycline (200 mg) - do not take with any dairy products within 4 hours   Tick is identified as a deer tick  Tick is assumed to be attached for more than 24 hours  Treatment is started within 72 hours of the tick bite      The proper technique for removal of the attached tick includes the following steps:  If available, use tweezers or small forceps to grasp the tick as close to the skin surface as possible. In the absence of tweezers, use paper or cloth to protect the fingers during tick extraction.   Pull straight up gently but firmly, using steady pressure. Do not jerk or twist.   Do not squeeze, crush, or puncture the body of the tick, since its fluids may contain infectious agents.   Disinfect the skin thoroughly after removing the tick and wash hands with soap and water.   If sections of the mouthparts of the tick remain in the skin, they should be left alone as they will normally be expelled spontaneously.   After the tick removal and the skin cleansing, the person bitten (or the parents) should observe the area for the development of rash for up to 30 days following exposure. Components of tick saliva can cause rash  Since the tick usually needs to be attached for two to three days before transmission of the Lyme disease agent occurs, removal of the tick within this time frame often prevents the infection

## 2024-06-05 NOTE — NURSING NOTE
"Chief Complaint   Patient presents with    Tick Bite     Right armpit     Patient here for a tick bite x4 day in right arm pit. He states a red bullseye and was a female deer tick.    Initial /72   Pulse 92   Temp 98.7  F (37.1  C) (Tympanic)   Resp 17   Ht 1.727 m (5' 8\")   Wt 90.3 kg (199 lb 1.6 oz)   SpO2 98%   BMI 30.27 kg/m   Estimated body mass index is 30.27 kg/m  as calculated from the following:    Height as of this encounter: 1.727 m (5' 8\").    Weight as of this encounter: 90.3 kg (199 lb 1.6 oz).  Medication Review: complete    The next two questions are to help us understand your food security.  If you are feeling you need any assistance in this area, we have resources available to support you today.          6/5/2024   SDOH- Food Insecurity   Within the past 12 months, did you worry that your food would run out before you got money to buy more? N   Within the past 12 months, did the food you bought just not last and you didn t have money to get more? N         Health Care Directive:  Patient does not have a Health Care Directive or Living Will: Discussed advance care planning with patient; however, patient declined at this time.    Rosita Armenta LPN      "

## 2024-06-05 NOTE — PROGRESS NOTES
ASSESSMENT/PLAN:     I have reviewed the nursing notes.  I have reviewed the findings, diagnosis, plan and need for follow up with the patient.    1. Tick bite of axillary region, right, initial encounter    - doxycycline hyclate (VIBRA-TABS) 100 MG tablet; Take 2 tablets (200 mg) by mouth once for 1 dose  Dispense: 2 tablet; Refill: 0    Doxycycline 200 mg one time for prophylactic dosing  No lab work needed today as it is too soon for accurate results   Discussed tick education at length   Discussed warning signs/symptoms indicative of need to f/u  Follow up if symptoms persist or worsen or concerns      I explained my diagnostic considerations and recommendations to the patient, who voiced understanding and agreement with the treatment plan. All questions were answered. We discussed potential side effects of any prescribed or recommended therapies, as well as expectations for response to treatments.    Beatris Reyna NP  [facility]      SUBJECTIVE:   Demarco Epps is a 34 year old male who presents to clinic today for the following health issues:  Tick bite      HPI  Tick bite in right axillary area about 3 days ago.  Patient states he has a bulls eye rash.  Patient states it was a female deer tick.  No known hx of prior tick disease/illness.  No current, new or worsening symptoms consistent with tick illness such as fever, chills, headaches, joint aches or swelling, fatigue, etc        Past Medical History:   Diagnosis Date    Personal history of other medical treatment (CODE)     No Comments Provided     Past Surgical History:   Procedure Laterality Date    OTHER SURGICAL HISTORY      [id number],NO PAST SURGERIES     Social History     Tobacco Use    Smoking status: Former     Types: Cigars, Other     Start date: 11/1/2015    Smokeless tobacco: Current     Types: Chew    Tobacco comments:     Quit smoking: vape   Substance Use Topics    Alcohol use: Yes     Alcohol/week: 12.0 standard  "drinks of alcohol     Comment: 12 a month      Current Outpatient Medications   Medication Sig Dispense Refill    cyclobenzaprine (FLEXERIL) 10 MG tablet Take 10 mg by mouth daily      gabapentin (NEURONTIN) 100 MG capsule Take 100 mg by mouth 2 times daily      PARoxetine (PAXIL) 30 MG tablet Take 30 mg by mouth At Bedtime      tiZANidine (ZANAFLEX) 2 MG tablet Take 1-2 tablets (2-4 mg) by mouth 3 times daily as needed for muscle spasms (Patient not taking: Reported on 6/5/2024) 60 tablet 1     No Known Allergies      Past medical history, past surgical history, current medications and allergies reviewed and accurate to the best of my knowledge.        OBJECTIVE:     /72   Pulse 92   Temp 98.7  F (37.1  C) (Tympanic)   Resp 17   Ht 1.727 m (5' 8\")   Wt 90.3 kg (199 lb 1.6 oz)   SpO2 98%   BMI 30.27 kg/m    Body mass index is 30.27 kg/m .        Physical Exam  General Appearance: Well appearing adult male, appropriate appearance for age. No acute distress  Respiratory: normal chest wall and respirations.  Normal effort.  C No cough appreciated.  Musculoskeletal:  Equal movement of bilateral upper extremities.  Equal movement of bilateral lower extremities.  Normal gait.    Dermatological:  right axillary area with small erythematous circular lesion consistent with tick bite with local reaction  Psychological: normal affect, alert, oriented, and pleasant.             "

## 2024-07-06 ENCOUNTER — HEALTH MAINTENANCE LETTER (OUTPATIENT)
Age: 34
End: 2024-07-06

## 2025-07-13 ENCOUNTER — HEALTH MAINTENANCE LETTER (OUTPATIENT)
Age: 35
End: 2025-07-13